# Patient Record
Sex: FEMALE | Race: WHITE | NOT HISPANIC OR LATINO | Employment: FULL TIME | URBAN - METROPOLITAN AREA
[De-identification: names, ages, dates, MRNs, and addresses within clinical notes are randomized per-mention and may not be internally consistent; named-entity substitution may affect disease eponyms.]

---

## 2023-09-30 ENCOUNTER — PHARMACY VISIT (OUTPATIENT)
Dept: PHARMACY | Facility: CLINIC | Age: 48
End: 2023-09-30
Payer: COMMERCIAL

## 2023-10-05 DIAGNOSIS — Z12.11 COLON CANCER SCREENING: ICD-10-CM

## 2023-10-05 DIAGNOSIS — Z12.31 ENCOUNTER FOR SCREENING MAMMOGRAM FOR MALIGNANT NEOPLASM OF BREAST: Primary | ICD-10-CM

## 2023-10-05 DIAGNOSIS — E66.01 MORBID OBESITY (MULTI): ICD-10-CM

## 2023-10-05 DIAGNOSIS — N60.01 BREAST CYST, RIGHT: ICD-10-CM

## 2023-10-05 DIAGNOSIS — R53.83 OTHER FATIGUE: ICD-10-CM

## 2023-10-05 DIAGNOSIS — R73.03 PREDIABETES: ICD-10-CM

## 2023-10-06 NOTE — PROGRESS NOTES
Apparently patient called to request her mammogram and ultrasound breast orders and colonoscopy order and lab order to be updated.

## 2023-10-24 ENCOUNTER — SOCIAL WORK (OUTPATIENT)
Dept: BEHAVIORAL HEALTH | Facility: CLINIC | Age: 48
End: 2023-10-24
Payer: COMMERCIAL

## 2023-10-24 DIAGNOSIS — F41.9 ANXIETY AND DEPRESSION: ICD-10-CM

## 2023-10-24 DIAGNOSIS — F43.10 PTSD (POST-TRAUMATIC STRESS DISORDER): ICD-10-CM

## 2023-10-24 DIAGNOSIS — F32.A ANXIETY AND DEPRESSION: ICD-10-CM

## 2023-10-24 PROCEDURE — 90837 PSYTX W PT 60 MINUTES: CPT | Performed by: SOCIAL WORKER
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
## 2023-10-29 PROBLEM — R31.9 HEMATURIA: Status: ACTIVE | Noted: 2023-10-29

## 2023-10-29 PROBLEM — F32.A ANXIETY AND DEPRESSION: Status: ACTIVE | Noted: 2023-10-29

## 2023-10-29 PROBLEM — R53.83 FATIGUE: Status: ACTIVE | Noted: 2023-10-29

## 2023-10-29 PROBLEM — F51.5 NIGHTMARES ASSOCIATED WITH CHRONIC POST-TRAUMATIC STRESS DISORDER: Status: ACTIVE | Noted: 2023-10-29

## 2023-10-29 PROBLEM — R73.01 ELEVATED FASTING GLUCOSE: Status: ACTIVE | Noted: 2023-10-29

## 2023-10-29 PROBLEM — F33.40 DEPRESSION, MAJOR, RECURRENT, IN REMISSION (CMS-HCC): Status: ACTIVE | Noted: 2023-10-29

## 2023-10-29 PROBLEM — E78.5 HYPERLIPIDEMIA: Status: ACTIVE | Noted: 2023-10-29

## 2023-10-29 PROBLEM — S09.90XA INJURY OF HEAD AND NECK: Status: ACTIVE | Noted: 2023-10-29

## 2023-10-29 PROBLEM — R63.5 WEIGHT GAIN: Status: ACTIVE | Noted: 2023-10-29

## 2023-10-29 PROBLEM — R92.8 ABNORMAL FINDING ON MAMMOGRAPHY: Status: ACTIVE | Noted: 2023-10-29

## 2023-10-29 PROBLEM — Z86.59 HISTORY OF CLAUSTROPHOBIA: Status: ACTIVE | Noted: 2023-10-29

## 2023-10-29 PROBLEM — F43.12 NIGHTMARES ASSOCIATED WITH CHRONIC POST-TRAUMATIC STRESS DISORDER: Status: ACTIVE | Noted: 2023-10-29

## 2023-10-29 PROBLEM — R29.898 WEAKNESS OF LIMB: Status: ACTIVE | Noted: 2023-10-29

## 2023-10-29 PROBLEM — H52.03 HYPEROPIA OF BOTH EYES: Status: ACTIVE | Noted: 2023-10-29

## 2023-10-29 PROBLEM — F41.9 ANXIETY AND DEPRESSION: Status: ACTIVE | Noted: 2023-10-29

## 2023-10-29 PROBLEM — R30.0 DYSURIA: Status: ACTIVE | Noted: 2023-10-29

## 2023-10-29 PROBLEM — F90.9 ADHD (ATTENTION DEFICIT HYPERACTIVITY DISORDER): Status: ACTIVE | Noted: 2023-10-29

## 2023-10-29 PROBLEM — R32 URINE INCONTINENCE: Status: ACTIVE | Noted: 2023-10-29

## 2023-10-29 PROBLEM — R29.898 TRANSIENT LEFT LEG WEAKNESS: Status: ACTIVE | Noted: 2023-10-29

## 2023-10-29 PROBLEM — E88.810 INSULIN RESISTANCE SYNDROME: Status: ACTIVE | Noted: 2023-10-29

## 2023-10-29 PROBLEM — G47.33 OBSTRUCTIVE SLEEP APNEA: Status: ACTIVE | Noted: 2023-10-29

## 2023-10-29 PROBLEM — S19.9XXA INJURY OF HEAD AND NECK: Status: ACTIVE | Noted: 2023-10-29

## 2023-10-29 PROBLEM — S92.309A CLOSED FRACTURE OF METATARSAL BONE: Status: ACTIVE | Noted: 2017-10-27

## 2023-10-29 PROBLEM — G47.10 HYPERSOMNIA: Status: ACTIVE | Noted: 2023-10-29

## 2023-10-29 PROBLEM — H52.203 ASTIGMATISM OF BOTH EYES: Status: ACTIVE | Noted: 2023-10-29

## 2023-10-29 PROBLEM — F34.1 PERSISTENT DEPRESSIVE DISORDER WITH ANXIOUS DISTRESS, CURRENTLY MODERATE: Status: ACTIVE | Noted: 2023-10-29

## 2023-10-29 PROBLEM — F43.10 POSTTRAUMATIC STRESS DISORDER: Status: ACTIVE | Noted: 2023-10-29

## 2023-10-29 PROBLEM — R31.0 GROSS HEMATURIA: Status: ACTIVE | Noted: 2023-10-29

## 2023-10-29 PROBLEM — M76.899 QUADRICEPS TENDONITIS: Status: ACTIVE | Noted: 2023-10-29

## 2023-10-29 PROBLEM — E03.9 HYPOTHYROIDISM: Status: ACTIVE | Noted: 2023-10-29

## 2023-10-29 PROBLEM — G57.90 NEUROPATHY, LEG: Status: ACTIVE | Noted: 2023-10-29

## 2023-10-29 PROBLEM — E55.9 VITAMIN D DEFICIENCY: Status: ACTIVE | Noted: 2023-10-29

## 2023-10-29 PROBLEM — F43.89 OTHER REACTIONS TO SEVERE STRESS: Status: ACTIVE | Noted: 2023-10-29

## 2023-10-29 PROBLEM — S83.002A SUBLUXATION OF LEFT PATELLA: Status: ACTIVE | Noted: 2023-10-29

## 2023-10-29 PROBLEM — M22.2X2 PATELLOFEMORAL STRESS SYNDROME OF LEFT KNEE: Status: ACTIVE | Noted: 2023-10-29

## 2023-10-29 RX ORDER — METFORMIN HYDROCHLORIDE 500 MG/1
TABLET, EXTENDED RELEASE ORAL
COMMUNITY
Start: 2022-09-19 | End: 2023-12-13

## 2023-10-29 RX ORDER — ALBUTEROL SULFATE 90 UG/1
AEROSOL, METERED RESPIRATORY (INHALATION)
COMMUNITY
End: 2023-12-13

## 2023-10-29 RX ORDER — ASPIRIN 325 MG
1 TABLET ORAL DAILY
COMMUNITY
Start: 2021-10-12 | End: 2023-12-13

## 2023-10-29 RX ORDER — BUSPIRONE HYDROCHLORIDE 7.5 MG/1
7.5 TABLET ORAL 2 TIMES DAILY
COMMUNITY
Start: 2023-06-01 | End: 2023-12-13

## 2023-10-29 RX ORDER — PRAZOSIN HYDROCHLORIDE 1 MG/1
2 CAPSULE ORAL NIGHTLY
COMMUNITY
Start: 2023-03-16 | End: 2023-12-13

## 2023-10-29 RX ORDER — IBUPROFEN 600 MG/1
1 TABLET ORAL AS NEEDED
COMMUNITY
End: 2023-12-19 | Stop reason: WASHOUT

## 2023-10-29 RX ORDER — CHOLECALCIFEROL (VITAMIN D3) 125 MCG
CAPSULE ORAL
COMMUNITY
Start: 2021-10-12 | End: 2023-12-13

## 2023-10-29 RX ORDER — ESTRADIOL 0.03 MG/D
1 FILM, EXTENDED RELEASE TRANSDERMAL 2 TIMES WEEKLY
COMMUNITY
Start: 2021-10-12 | End: 2023-12-13

## 2023-10-29 RX ORDER — NITROFURANTOIN 25; 75 MG/1; MG/1
100 CAPSULE ORAL EVERY 12 HOURS
COMMUNITY
Start: 2022-10-23 | End: 2023-12-13

## 2023-10-29 RX ORDER — CHOLECALCIFEROL (VITAMIN D3) 50 MCG
TABLET ORAL
COMMUNITY
End: 2023-12-13

## 2023-10-29 RX ORDER — SENNOSIDES/DOCUSATE SODIUM 8.6MG-50MG
TABLET ORAL
COMMUNITY
Start: 2022-09-19

## 2023-10-29 RX ORDER — LANCETS
EACH MISCELLANEOUS
COMMUNITY
Start: 2023-06-21

## 2023-10-29 RX ORDER — LEVOTHYROXINE SODIUM 25 UG/1
1 TABLET ORAL DAILY
COMMUNITY
Start: 2020-10-30 | End: 2023-12-13

## 2023-10-31 ENCOUNTER — APPOINTMENT (OUTPATIENT)
Dept: PRIMARY CARE | Facility: CLINIC | Age: 48
End: 2023-10-31
Payer: COMMERCIAL

## 2023-11-07 ENCOUNTER — SOCIAL WORK (OUTPATIENT)
Dept: BEHAVIORAL HEALTH | Facility: CLINIC | Age: 48
End: 2023-11-07
Payer: COMMERCIAL

## 2023-11-07 DIAGNOSIS — F41.9 ANXIETY AND DEPRESSION: ICD-10-CM

## 2023-11-07 DIAGNOSIS — F43.10 POSTTRAUMATIC STRESS DISORDER: ICD-10-CM

## 2023-11-07 DIAGNOSIS — F32.A ANXIETY AND DEPRESSION: ICD-10-CM

## 2023-11-07 PROCEDURE — 90837 PSYTX W PT 60 MINUTES: CPT | Performed by: SOCIAL WORKER

## 2023-11-07 NOTE — PROGRESS NOTES
TIME:   2-3pm    LOCATION:   Home ([facility]).    CHIEF COMPLAINT:  Severe family issues impacting mood and functioning.    TREATMENT MODALITY:  EMDR Therapy, 1 x week.    DIAGNOSES:  Anxiety and Depression (F41.9 and F32.A)  Posttraumatic Stress Disorder (F43.10)    SYMPTOMS:  Client continues to struggle with trauma related symptoms, avoidance negative cognitions, arousal/reactivity and functional impairment.  Client struggling with the reality of the situation leaving her to feel alone and not having people to count on, as well as grieving about the losses involved in her predicament with her mother and son (N).     MENTAL STATUS:  Client was oriented to person, place and time.  Mood/affect: Angry.    NARRATIVE:  Processed issues with ex, son N, work and her mother. Provided support.  Also explored ways to dilute stress.    PLAN:  Resolve trauma and related negative cognitions, emotional charge and somatic distress.

## 2023-11-14 ENCOUNTER — TREATMENT (OUTPATIENT)
Dept: PHYSICAL THERAPY | Facility: CLINIC | Age: 48
End: 2023-11-14
Payer: COMMERCIAL

## 2023-11-14 ENCOUNTER — APPOINTMENT (OUTPATIENT)
Dept: BEHAVIORAL HEALTH | Facility: CLINIC | Age: 48
End: 2023-11-14
Payer: COMMERCIAL

## 2023-11-14 DIAGNOSIS — M54.2 CERVICALGIA: Primary | ICD-10-CM

## 2023-11-14 DIAGNOSIS — F07.81 POST CONCUSSION SYNDROME: ICD-10-CM

## 2023-11-14 PROCEDURE — 97110 THERAPEUTIC EXERCISES: CPT | Mod: GP | Performed by: PHYSICAL THERAPIST

## 2023-11-14 PROCEDURE — 97140 MANUAL THERAPY 1/> REGIONS: CPT | Mod: GP | Performed by: PHYSICAL THERAPIST

## 2023-11-14 ASSESSMENT — PAIN - FUNCTIONAL ASSESSMENT: PAIN_FUNCTIONAL_ASSESSMENT: 0-10

## 2023-11-14 NOTE — PROGRESS NOTES
Physical Therapy    Physical Therapy Treatment    Patient Name: Paradise Brothers  MRN: [id number]  Today's Date: 11/14/2023         Assessment:   Patient with increased tone in left>right paraspinals this date.  Pain decreased to 2/10 at end of session.    Plan:   Add thoracic strengthening to HEP    Current Problem  No diagnosis found.    Subjective   Patient states that she had been doing good and then pain kicked up and she was able to get right side under control, but left is unable to get resolved.    General  Reason for Referral: post consussion  Referred By: Jazmyne Herrera    Pain  Pain Assessment: 0-10  Pain Score:  (6-7)  Pain Location:  (left cervical and thoracic region)    Objective   Cognition     Posture   Forward head and rounded shoulders    Extremity/Trunk Assessment  Cervical ROM   PROM   EXTENSION 50   FLEXION 40   RIGHT ROTATION 60   LEFT ROTATION 50   RIGHT LATERAL FLEXION 40   LEFT LATERAL FLEXION 30     Treatments:  Therapeutic Exercise  Therapeutic Exercise Performed: Yes  Lateral cervical flexion cues for chin tuck to increase middle scalenes stretch  Upper cervical towel stretch   Lower cervical towel stretch    Manual Therapy  Manual Therapy Performed: Yes  Stripping to middle scalenes, trigger point to left paraspinals and occupitals with left >right.      Goals:  Active       PT Problem       Activity Limitation: 1. Post concussion        Start:  11/14/23    Expected End:  02/12/24       Activity Limitation: 1. Post concussion symptom rating scale at or less than 30 points to allow increased ease to return to normal activity.   2. Patient decrease DHI to 30 points for self reported improvement in function, by week 12   Balance: Will be assess on future visits, by week 12   Dizziness/Vertigo/Disequilibrium: Patient will report dizziness at 0-2/10 to allow increased ease in daily tasks, by week 12   Pain: Patient will report neck pain at 0-2/10 to allow increased ease in scanning her  environment, by week 12   Range Of Motion/Joint Mobility: Patient increase cervical rotation to 0-50 to the left to allow increased ease in scanning the environment, by week 12   Patient will be independent in Home Exercise Program., by week 1            Activity Limitation: 1. Post concussion symptom rating scale at or less than 30 points to allow increased ease to return to normal activity.   2. Patient decrease DHI to 30 points for self reported improvement in function, by week 12   Balance: Will be assess on future visits, by week 12   Dizziness/Vertigo/Disequilibrium: Patient will report dizziness at 0-2/10 to allow increased ease in daily tasks, by week 12   Pain: Patient will report neck pain at 0-2/10 to allow increased ease in scanning her environment, by week 12   Range Of Motion/Joint Mobility: Patient increase cervical rotation to 0-50 to the left to allow increased ease in scanning the environment, by week 12   Patient will be independent in Home Exercise Program., by week 1

## 2023-11-15 ENCOUNTER — APPOINTMENT (OUTPATIENT)
Dept: PHYSICAL THERAPY | Facility: CLINIC | Age: 48
End: 2023-11-15
Payer: COMMERCIAL

## 2023-11-17 NOTE — PROGRESS NOTES
Physical Therapy    Physical Therapy Treatment    Patient Name: Paradise Brothers  MRN: [id number]  Today's Date: 11/20/2023         Assessment:   Patient with continued increased tone in semi spinalis and paraspinals  Plan:   Continue MT    Current Problem  1. Cervicalgia        2. Post concussion syndrome          Subjective  Patient states that she has a migraine in a banana pattern after last session.    Pain  Pain Assessment  Pain Assessment: 0-10  Pain Score:  (left cervical region)sore    Objective     Treatments:    NEXT session add cervical flexion with over pressure with towel roll to minimize posture slouching     Therapeutic Exercise  Therapeutic Exercise Performed: Yes  Lateral cervical flexion cues for chin tuck to increase middle scalenes stretch  Upper cervical towel stretch   Lower cervical towel stretch    Manual Therapy  Manual Therapy Performed: Yes  Trigger point release to posterior scalenes, semispinalis, paraspinals left side greater than right.  Patient educated to use heat and ice as a headache can easily occur tomorrow due to muscle release of lactic acid.    Stripping to middle scalenes, trigger point to left paraspinals and occupitals with left >right.      Goals:  Active       PT Problem       Activity Limitation: 1. Post concussion        Start:  11/14/23    Expected End:  02/12/24       Activity Limitation: 1. Post concussion symptom rating scale at or less than 30 points to allow increased ease to return to normal activity.   2. Patient decrease DHI to 30 points for self reported improvement in function, by week 12   Balance: Will be assess on future visits, by week 12   Dizziness/Vertigo/Disequilibrium: Patient will report dizziness at 0-2/10 to allow increased ease in daily tasks, by week 12   Pain: Patient will report neck pain at 0-2/10 to allow increased ease in scanning her environment, by week 12   Range Of Motion/Joint Mobility: Patient increase cervical rotation to 0-50 to the  left to allow increased ease in scanning the environment, by week 12   Patient will be independent in Home Exercise Program., by week 1

## 2023-11-20 ENCOUNTER — TREATMENT (OUTPATIENT)
Dept: PHYSICAL THERAPY | Facility: CLINIC | Age: 48
End: 2023-11-20
Payer: COMMERCIAL

## 2023-11-20 DIAGNOSIS — M54.2 CERVICALGIA: Primary | ICD-10-CM

## 2023-11-20 DIAGNOSIS — F07.81 POST CONCUSSION SYNDROME: ICD-10-CM

## 2023-11-20 PROCEDURE — 97140 MANUAL THERAPY 1/> REGIONS: CPT | Mod: GP | Performed by: PHYSICAL THERAPIST

## 2023-11-20 ASSESSMENT — PAIN - FUNCTIONAL ASSESSMENT: PAIN_FUNCTIONAL_ASSESSMENT: 0-10

## 2023-11-21 ENCOUNTER — SOCIAL WORK (OUTPATIENT)
Dept: BEHAVIORAL HEALTH | Facility: CLINIC | Age: 48
End: 2023-11-21
Payer: COMMERCIAL

## 2023-11-21 ENCOUNTER — APPOINTMENT (OUTPATIENT)
Dept: PRIMARY CARE | Facility: CLINIC | Age: 48
End: 2023-11-21
Payer: COMMERCIAL

## 2023-11-21 DIAGNOSIS — F43.10 POSTTRAUMATIC STRESS DISORDER: ICD-10-CM

## 2023-11-21 DIAGNOSIS — F41.9 ANXIETY AND DEPRESSION: ICD-10-CM

## 2023-11-21 DIAGNOSIS — F32.A ANXIETY AND DEPRESSION: ICD-10-CM

## 2023-11-21 PROCEDURE — 90837 PSYTX W PT 60 MINUTES: CPT | Performed by: SOCIAL WORKER

## 2023-11-21 NOTE — PROGRESS NOTES
TIME:   11-12pm    LOCATION:   Home ([facility]).    CHIEF COMPLAINT:  Severe family issues impacting mood and functioning.    TREATMENT MODALITY:  EMDR Therapy, 1 x week.    DIAGNOSES:  Anxiety and Depression (F41.9 and F32.A)  Posttraumatic Stress Disorder (F43.10)    SYMPTOMS:  Client continues to struggle with trauma related symptoms, avoidance negative cognitions, arousal/reactivity and functional impairment.  Client struggling with sons's estrangement, work load.    MENTAL STATUS:  Client was oriented to person, place and time.  Mood/affect: Angry.    NARRATIVE:  Processed family issues.  Client continues to deal with problems with ex wife, son (N) and the legal system; client indicates she will not be able to take time off until January, but is is able to take time to enjoy the Thanksgiving holiday with friends and son (S). Provided support.    PLAN:  Resolve trauma and related negative cognitions, emotional charge and somatic distress.

## 2023-11-21 NOTE — PROGRESS NOTES
Subjective   Patient ID:   [id number]   [id number]   Paradise Brothers is a 47 y.o. female who presents for No chief complaint on file..  HPI    ROS were reviewed and are negative with the exception of what is noted in HPI    There were no vitals taken for this visit.  Objective   Physical Exam    Assessment/Plan   Problem List Items Addressed This Visit    None       Provider Impressions     1. left leg weakness - complete neurologic workup was negative. SEems to be suprapatellar and related to her knee. SHe is doing better walking her new dog. REferral to ortho [facility] although not lots of free time.   2. Fatigue - actually doing better using CPAP, lots of personal stressors.   3. Borderline hypertension - she has continued to lose weight and her pressure is much better today. SHe is doing so much better. .   4. Depression - she has good support with psychiatry. On bupropion. MAny life stressors primarily related to her sons and  partner and the stresses in trying to remain in her parenting role for her sons.   5. KERRY - she has CPAP mask at home now.   6. weight- she saw endocrine and the Wegovy was denied. SHe has done quite well with the bupropion and the metformin losing 20 pounds. REcheck her hemoglobin A1C and feel if her number is elevated would be able to prescribe the GLP1 agonist for control  7. Vitamin D - takes supplements  8. Foot fracture - doing better  9. Facial rash - rosacea suspected. To derm eventually  10. shoulder repair - better  11. Health maintenance - full PE 3/23 today,. PAP (s/p hysterectomy, one ovary intact) mammo ordered with repeat ultrasound of breast probably benign cyst. Colonoscopy ordered, counseled about personal status majority of time.    Jazmyne Herrera MD

## 2023-11-26 NOTE — PROGRESS NOTES
Physical Therapy    Physical Therapy Treatment    Patient Name: Paradise Brothers  MRN: [id number]  Today's Date: 11/25/2023         Assessment:     Plan:       Current Problem  No diagnosis found.    General          Subjective    Precautions     Vital Signs     Pain       Objective   Cognition     Posture     Extremity/Trunk Assessment  {Extremity/Trunk Assessments:15845}  {Spine,UE,LE,HAND,LYMPHEDEMA:77861}  Activity Tolerance:     Outcome Measures:  {PT Outcome Measures:18828}  Treatments:  {PT Treatments:12741}  NEXT session add cervical flexion with over pressure with towel roll to minimize posture slouching     Therapeutic Exercise  Therapeutic Exercise Performed: Yes  Lateral cervical flexion cues for chin tuck to increase middle scalenes stretch  Upper cervical towel stretch   Lower cervical towel stretch    Manual Therapy  Manual Therapy Performed: Yes  Trigger point release to posterior scalenes, semispinalis, paraspinals left side greater than right.  Patient educated to use heat and ice as a headache can easily occur tomorrow due to muscle release of lactic acid.    Stripping to middle scalenes, trigger point to left paraspinals and occupitals with left >right.      OP EDUCATION:       Goals:

## 2023-11-27 ENCOUNTER — APPOINTMENT (OUTPATIENT)
Dept: PHYSICAL THERAPY | Facility: CLINIC | Age: 48
End: 2023-11-27
Payer: COMMERCIAL
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
## 2023-11-29 ENCOUNTER — APPOINTMENT (OUTPATIENT)
Dept: SLEEP MEDICINE | Facility: CLINIC | Age: 48
End: 2023-11-29
Payer: COMMERCIAL

## 2023-12-05 ENCOUNTER — SOCIAL WORK (OUTPATIENT)
Dept: BEHAVIORAL HEALTH | Facility: CLINIC | Age: 48
End: 2023-12-05
Payer: COMMERCIAL

## 2023-12-05 DIAGNOSIS — F32.A ANXIETY AND DEPRESSION: ICD-10-CM

## 2023-12-05 DIAGNOSIS — F43.10 PTSD (POST-TRAUMATIC STRESS DISORDER): ICD-10-CM

## 2023-12-05 DIAGNOSIS — F41.9 ANXIETY AND DEPRESSION: ICD-10-CM

## 2023-12-05 PROCEDURE — 90837 PSYTX W PT 60 MINUTES: CPT | Performed by: SOCIAL WORKER

## 2023-12-05 NOTE — PROGRESS NOTES
TIME:   11-12pm    LOCATION:   Home ([facility]).    CHIEF COMPLAINT:  Severe family issues impacting mood and functioning.    DIAGNOSES:  Anxiety and Depression (F41.9 and F32.A)  Posttraumatic Stress Disorder (F43.10)    TREATMENT MODALITY:  EMDR Therapy, Supportive. 1 x week.    SYMPTOMS:  Client continues to struggle with trauma related symptoms, avoidance negative cognitions, arousal/reactivity and functional impairment.  Client struggling with sons's estrangement, ex wife's lack of collaboration, the legal system and her work load.    MENTAL STATUS:  Client was oriented to person, place and time.  Mood/affect: Angry.    NARRATIVE:  Processed family issues.  Client continues to deal with problems with ex wife, son (N) and the legal system; client relates desire to relinquish parenting rights for son N. Explored the issue, worked to co-regulate, attune and validate, and provided support.    PLAN:  Resolve trauma and related negative cognitions, emotional charge and somatic distress.

## 2023-12-06 ENCOUNTER — TREATMENT (OUTPATIENT)
Dept: PHYSICAL THERAPY | Facility: CLINIC | Age: 48
End: 2023-12-06
Payer: COMMERCIAL

## 2023-12-06 DIAGNOSIS — M54.2 CERVICALGIA: Primary | ICD-10-CM

## 2023-12-06 DIAGNOSIS — F07.81 POST CONCUSSION SYNDROME: ICD-10-CM

## 2023-12-06 PROCEDURE — 97140 MANUAL THERAPY 1/> REGIONS: CPT | Mod: GP | Performed by: PHYSICAL THERAPIST

## 2023-12-06 ASSESSMENT — PAIN SCALES - GENERAL: PAINLEVEL_OUTOF10: 3

## 2023-12-06 ASSESSMENT — PAIN - FUNCTIONAL ASSESSMENT: PAIN_FUNCTIONAL_ASSESSMENT: 0-10

## 2023-12-06 NOTE — PROGRESS NOTES
Physical Therapy    Physical Therapy Treatment    Patient Name: Paradise Brothers  MRN: [id number]  Today's Date: 11/25/2023         Assessment:  Patient with continued tone on left side in rhomboid and middle scalenes.     Plan:  Continue to work to manage tone    Current Problem  No diagnosis found.      Subjective  Patient reports continued left side pain    Pain  See Flow sheet     Objective     Treatments:    NEXT session add cervical flexion with over pressure with towel roll to minimize posture slouching     Therapeutic Exercise  Therapeutic Exercise Performed: no  Lateral cervical flexion cues for chin tuck to increase middle scalenes stretch  Upper cervical towel stretch   Lower cervical towel stretch    Manual Therapy  Manual Therapy Performed: Yes  Trigger point release to posterior scalenes, middle scalenes, paraspinals left side. Stripping to middle scalenes, trigger point to left paraspinals and occupitals with left >right.
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
## 2023-12-13 ENCOUNTER — SOCIAL WORK (OUTPATIENT)
Dept: BEHAVIORAL HEALTH | Facility: CLINIC | Age: 48
End: 2023-12-13
Payer: COMMERCIAL

## 2023-12-13 ENCOUNTER — OFFICE VISIT (OUTPATIENT)
Dept: SLEEP MEDICINE | Facility: CLINIC | Age: 48
End: 2023-12-13
Payer: COMMERCIAL

## 2023-12-13 VITALS
BODY MASS INDEX: 45.8 KG/M2 | HEIGHT: 66 IN | OXYGEN SATURATION: 98 % | SYSTOLIC BLOOD PRESSURE: 142 MMHG | DIASTOLIC BLOOD PRESSURE: 88 MMHG | WEIGHT: 285 LBS | HEART RATE: 72 BPM | RESPIRATION RATE: 18 BRPM

## 2023-12-13 DIAGNOSIS — F41.9 ANXIETY AND DEPRESSION: ICD-10-CM

## 2023-12-13 DIAGNOSIS — F32.A ANXIETY AND DEPRESSION: ICD-10-CM

## 2023-12-13 DIAGNOSIS — Z78.9 NONSMOKER: ICD-10-CM

## 2023-12-13 DIAGNOSIS — G47.33 OBSTRUCTIVE SLEEP APNEA (ADULT) (PEDIATRIC): ICD-10-CM

## 2023-12-13 DIAGNOSIS — G47.33 OBSTRUCTIVE SLEEP APNEA: Primary | ICD-10-CM

## 2023-12-13 DIAGNOSIS — F43.10 PTSD (POST-TRAUMATIC STRESS DISORDER): ICD-10-CM

## 2023-12-13 DIAGNOSIS — R03.0 ELEVATED BP WITHOUT DIAGNOSIS OF HYPERTENSION: ICD-10-CM

## 2023-12-13 PROCEDURE — 90837 PSYTX W PT 60 MINUTES: CPT | Performed by: SOCIAL WORKER

## 2023-12-13 PROCEDURE — 3008F BODY MASS INDEX DOCD: CPT | Performed by: NURSE PRACTITIONER

## 2023-12-13 PROCEDURE — 1036F TOBACCO NON-USER: CPT | Performed by: NURSE PRACTITIONER

## 2023-12-13 PROCEDURE — 99214 OFFICE O/P EST MOD 30 MIN: CPT | Performed by: NURSE PRACTITIONER

## 2023-12-13 ASSESSMENT — SLEEP AND FATIGUE QUESTIONNAIRES
HOW LIKELY ARE YOU TO NOD OFF OR FALL ASLEEP WHILE SITTING AND READING: SLIGHT CHANCE OF DOZING
HOW LIKELY ARE YOU TO NOD OFF OR FALL ASLEEP WHEN YOU ARE A PASSENGER IN A CAR FOR AN HOUR WITHOUT A BREAK: WOULD NEVER DOZE
WAKING_TOO_EARLY: MILD
HOW LIKELY ARE YOU TO NOD OFF OR FALL ASLEEP WHILE LYING DOWN TO REST IN THE AFTERNOON WHEN CIRCUMSTANCES PERMIT: SLIGHT CHANCE OF DOZING
SITING INACTIVE IN A PUBLIC PLACE LIKE A CLASS ROOM OR A MOVIE THEATER: SLIGHT CHANCE OF DOZING
SLEEP_PROBLEM_NOTICEABLE_TO_OTHERS: A LITTLE
HOW LIKELY ARE YOU TO NOD OFF OR FALL ASLEEP WHILE WATCHING TV: SLIGHT CHANCE OF DOZING
WORRIED_DISTRESSED_DUE_TO_SLEEP: NOT AT ALL NOTICEABLE
DIFFICULTY_STAYING_ASLEEP: MILD
SLEEP_PROBLEM_INTERFERES_DAILY_ACTIVITIES: A LITTLE
SATISFACTION_WITH_CURRENT_SLEEP_PATTERN: SATISFIED
HOW LIKELY ARE YOU TO NOD OFF OR FALL ASLEEP WHILE SITTING AND TALKING TO SOMEONE: WOULD NEVER DOZE
HOW LIKELY ARE YOU TO NOD OFF OR FALL ASLEEP IN A CAR, WHILE STOPPED FOR A FEW MINUTES IN TRAFFIC: WOULD NEVER DOZE
ESS-CHAD TOTAL SCORE: 4
HOW LIKELY ARE YOU TO NOD OFF OR FALL ASLEEP WHILE SITTING QUIETLY AFTER LUNCH WITHOUT ALCOHOL: WOULD NEVER DOZE

## 2023-12-13 ASSESSMENT — ENCOUNTER SYMPTOMS
LOSS OF SENSATION IN FEET: 0
OCCASIONAL FEELINGS OF UNSTEADINESS: 0
DEPRESSION: 0

## 2023-12-13 ASSESSMENT — COLUMBIA-SUICIDE SEVERITY RATING SCALE - C-SSRS
2. HAVE YOU ACTUALLY HAD ANY THOUGHTS OF KILLING YOURSELF?: NO
6. HAVE YOU EVER DONE ANYTHING, STARTED TO DO ANYTHING, OR PREPARED TO DO ANYTHING TO END YOUR LIFE?: NO
1. IN THE PAST MONTH, HAVE YOU WISHED YOU WERE DEAD OR WISHED YOU COULD GO TO SLEEP AND NOT WAKE UP?: NO

## 2023-12-13 ASSESSMENT — PATIENT HEALTH QUESTIONNAIRE - PHQ9
SUM OF ALL RESPONSES TO PHQ9 QUESTIONS 1 AND 2: 0
1. LITTLE INTEREST OR PLEASURE IN DOING THINGS: NOT AT ALL
2. FEELING DOWN, DEPRESSED OR HOPELESS: NOT AT ALL
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
## 2023-12-13 NOTE — ASSESSMENT & PLAN NOTE
#Elevated BP without dx of HTN  BP Readings from Last 1 Encounters:   12/13/23 142/88     - doing well, asymptomatic, denies any headache, blurry vision, chest pain, palpitation, dizziness, lightheadedness, or syncopal episodes  - discussed at length the impact of untreated KERRY and BP control  - encouraged to monitor BP at home and follow-up with PCP

## 2023-12-13 NOTE — PATIENT INSTRUCTIONS
[facility] Sleep Medicine  DO [address] [place] [address] [place] [address]       NAME: Paradise Brothers   DATE: 12/13/23    Your Sleep Provider Today: ELIS Garzon  Your Primary Care Physician: Jazmyne Herrera MD       DIAGNOSIS:   1. Obstructive sleep apnea            Thank you for coming to the Sleep Medicine Clinic today! Your sleep medicine provider today was: ELIS Garzon Below is a summary of your treatment plan, other important information, and our contact numbers:      TREATMENT PLAN     - Follow-up in 12 months.  - If not already done, sign up for 'My Chart' and send prescription requests or messages through this  - Order placed to Medical Services for over night pulse oximetry on CPAP, will call with results.    Annual Reminders About Your Sleep Apnea    Your sleep apnea is well controlled based on reviewing your PAP Data Report. A supply renewal prescription has been sent to your DME company- [employer].    General Reminders:  Continue current machine settings. Continue using machine every night. Need at least 4 hours daily usage.   Remember to clean your mask, tubings, and water chamber regularly as instructed.  Know the replacement schedule of your supplies/ accessories and contact your DME (durable medical equipment) provider if you are due for them.  Avoid driving or operating heavy machinery when drowsy. A person driving while sleepy is 5 times more likely to have an accident. If you feel sleepy, pull over and take a short power nap (sleep for less than 30 minutes). Otherwise, ask somebody to drive you.    Follow-up sooner through MyChart or calling our office if you have any of the following symptoms:  Snoring or stopping breathing while using the machine  Recurrence of fatigue, sleepiness, insomnia, and other symptoms you had prior using machine  Persistent or worsening fatigue or sleepiness despite regular use of  machine  Issues tolerating the machine like bloating sensation, air hunger, too much hot air, too much pressure, taking off mask without recall in the middle of sleep, etc.     Other conservative measures to improve sleep apnea:  Losing weight can lead to some improvement of KERRY which means you will need lower pressures in machine to control your KERRY. In some patients, they don't need to use the machine after bariatric surgery. Hence, consider medical and/or surgical weight loss especially if your BMI is more than 35.  Avoiding alcohol, sedative-hypnotics, and sedating medications is imperative as these substances can worsen snoring and sleep apnea  If you have nasal congestion or seasonal allergies, improving your breathing through the nose is critical for treating sleep apnea, tolerating CPAP, and improving your sleep; hence, using intranasal steroid spray like Flonase might help. Make sure you know the proper way to use it.  Stay off your back when sleeping.    Common issues with PAP machine:  Mask gets dislodged when turning to the side: Consider getting a CPAP pillow or switching to a mask with hose on top.     Dry mouth:  Your machine has built-in humidifier that heats up the air to prevent dry mouth. It can be adjusted to your comfort. You can try that first and increase setting one level one night at a time to check which setting is comfortable and effective in lessening dry mouth. If dry mouth persists despite humidity setting adjustment, may apply OTC Biotene gel over the gums at bedtime.  If Biotene gel is not effective, consider trying XEROSTOM gel from [employer].  Also, eliminate or reduce dose of meds that can cause dry mouth if possible. Lastly, may try getting a separate room humidifier machine.    Airleaks: Please call DME as they may need to adjust your mask or refit you with a different kind of mask. In addition, you can ask DME for tips on getting a good mask seal and mask fit.     Difficulty  "tolerating the mask: Contact your DME to try a different kind of mask and/or call office to get a referral to Sleep Psychologist for CPAP desensitization. CPAP desensitization technique is a set of strategies that helps patient cope with claustrophobia and anxiety related to wearing mask. Alternatively, we can do a daytime mini-sleep study called PAP-nap trial wherein you will try on different kinds of mask and the sleep technician will try different pressure settings on CPAP and BPAP machines to see which specific pressure is tolerable and comfortable for you.     Water droplets or moisture within the hose and/or mask: This is called rain-out and it is caused by condensation of too much heated humidity on the cooler walls of the hose. If you have rain-out, turn down humidity settings or get a heated hose. If you already have a heated hose, turn up the \"tube temperature\" of the heated hose. Alternatively, if you don't want to get a heated hose or warmer air, may wrap the CPAP hose with stockings to keep it somewhat warm. Also, you need to place the machine on the floor and lower the hose so that water won't travel upward towards your mask.       Maintaining your CPAP/BPAP device:    The humidification chamber (aka water tank or water chamber) needs to be filled with distilled water to prevent buildup of white deposits in the future. If you cannot find distilled water, you can use tap water but expect to have white deposits buildup seen after prolonged use with tap water. If you start seeing white deposits on the water chamber, you can clean it by filling it with equal parts of distilled white vinegar and water. Let the vinegar-water mixture sit for 2 hours, and then rinse it with running tap water. Clean with soap and water then let it dry.     You should try to keep your machine clean in order to work well. Here are some tips to clean PAP supplies / accessories:    Clean the humidification chamber (aka water tank) " as well as your mask and tubing at least once a week with soap and water.   Alternatively, you can fill a sink or basin with warm water and add a little mild detergent, like Ivory dish soap. Gently wipe your supplies with the soapy water to free all the oils and dirt that may have collected. Once that's done, rinse these items with clean water until the soap is gone and let them air dry. You can hang your tubing over the curtain mariama in your bathroom so that it dries.  The mask insert (part of the mask that has contact with your skin) needs to be cleaned with soap and water daily. Another option is to wipe them down with CPAP wipes or baby wipes.    You should replace your mask and tubing frequently in order to prevent bacteria buildup, machine damage, and mask seal issues. The older the mask and hose, the high likelihood that there is bacteria buildup in it especially if they are not cleaned regularly. Dirty filters damage machines because build-up of dust and contaminants can cause machine to over-heat, and in time, damage the motor of machine. Cushions lose their seal over time as most masks are made of plastic and silicone while headgear is made of neoprene. These materials will break down with age and frequent use. Here is the recommended replacement schedule for PAP supplies / accessories:    Twice a month- disposable filters and cushions for nasal mask or nasal pillows.  Once a month- cushion for full face mask  Every 3 months- mask with headgear and PAP tubing (standard or heated hose)  Every 6 months- reusable filter, water chamber, and chin strap     Other useful information:    Some people do not put water in the tank while other people prefers to put water in the tank to prevent mouth dryness. Try to experiment to determine which is more comfortable for you.   In general, new machines have 2 years warranty on parts while health insurance allows you to have a new machine once every 5 years.      IMPORTANT  INFORMATION     Call 911 for medical emergencies.  Our offices are generally open from Monday-Friday, 9 am - 5 pm.  If you need to get in touch with me, you may either call me/my team (number is below) or you can use [employer].  If a referral for a test, for CPAP, or for another specialist was made, and you have not heard about scheduling this within a week, please call scheduling at [phone] ([phone]).  If you are unable to make your appointment for clinic or an overnight study, kindly call the office at least 48 hours in advance to cancel and reschedule.  If you are on CPAP, please bring your device's card or the device to each clinic appointment.   There are no supporting services by either the sleep doctors or their staff on weekends and Holidays, or after 5 PM on weekdays.   If you have been asked to come to a sleep study, make sure you bring toiletries, a comfy pillow, and any nighttime medications that you may regularly take. Also be sure to eat dinner before you arrive. We generally do not provide meals.      PRESCRIPTIONS     We require 7 days advanced notice for prescription refills. If we do not receive the request in this time, we cannot guarantee that your medication will be refilled in time.      IMPORTANT PHONE NUMBERS       Appointments (for Adult Sleep Clinic): [phone] - option 2  Appointments (For Sleep Studies): [phone] - option 3  Behavioral Sleep Medicine: [phone]  Sleep Surgery: [phone]  ENT (Otolaryngology): [phone]  Headache Clinic (Neurology): [phone]  Neurology: [phone]  Psychiatry: [phone]  Pulmonary Function Testing (PFT) Center: [phone]  Pulmonary Medicine: [phone]  [email or web address] (DME): [phone]  [employer] (DME): [phone]  [facility] (DME): [phone]        CONTACTING YOUR SLEEP MEDICINE PROVIDER AND SLEEP TEAM      For issues with your machine or mask interface, please call your DME  "provider first. DME stands for durable medical company. DME is the company who provides you the machine and/or PAP supplies / accessories.   To schedule, cancel, or reschedule SLEEP STUDY APPOINTMENTS, please call the Main Phone Line at [phone] - option 3.   To schedule, cancel, or reschedule CLINIC APPOINTMENTS, you can do it in \"MyChart\", call [phone] for [facility] office , [phone] for [place]. office to speak with my on site staff, or call the Main Phone Line at [phone] - option 2  For CLINICAL QUESTIONS or MEDICATION REFILLS, please call direct line for Adult Sleep Nurses at [phone].   Lastly, you can also send a message directly to your provider through \"My Chart\", which is the email service through your  Records Account: [email or web address]     Adult Sleep Nurses (Tarah Renteria, RN and Sujey Morales RN):  For clinical questions and refilling prescriptions: [phone]  Email sleep diaries and other documents at: [email or web address]    Office locations for Karrie Oliva NP:    [address]  [place] [address] [phone]    [address]  [address]  [place] [address] [phone]      OUR SLEEP TESTING LOCATIONS     Our team will contact you to schedule your sleep study, however, you can contact us as follow:  Main Phone Line (scheduling only): [phone]), option 3    Sleep Testing Locations:   [place] (18 years and older): [address] [place] [address], 2nd floor   [place] (18 years and older): [address] [facility]; 4th floor  After hours line: [phone]   [place] (18 years and older) at [place]: [address] [facility]  After hours line: [phone]   [facility] (Main campus: All ages): [facility], 6th floor. After hours line: [phone]   [place] (5 years and older; younger considered on case-by-case basis): [address]; " [facility] [address], [address]. Scheduling  After hours line: [phone]       Here at [facility], we wish you a restful sleep!    Your sleep medicine provider for this visit was: XIOMARA Garzon-CNP

## 2023-12-13 NOTE — ASSESSMENT & PLAN NOTE
BMI Readings from Last 1 Encounters:   12/13/23 46.00 kg/m²     - Encouraged healthy weight loss via diet and exercise  - Weight loss can help in the long term treatment of KERRY.  - Defer management to PCP

## 2023-12-13 NOTE — ASSESSMENT & PLAN NOTE
#KERRY  - HST 8/12/21 --> moderate KERRY AASM criteria, AHI 3% 15.2, AHI 4% 6.9. SpO2 garcia 79.6%.   - Retrieved and personally reviewed recent PAP adherence download data today. See HPI.  - good compliance to PAP therapy, residual AHI at goal, and good control of KERRY symptoms. Encouraged to make routine of applying PAP, increase usage and duration.   - Reports pulse ox has been dropping in her sleep into mid 80s (monitored on apple watch) even with CPAP use. Will order overnight pulse ox on PAP to DME- [facility], will call patient with results.   - continue current setting 6-13 CWP  - renew PAP supply orders, order placed to DME- [facility]  - diet, exercise, and weight loss were emphasized today in clinic, as were non-supine sleep, avoiding alcohol in the late evening, and driving or operating heavy machinery when sleepy. Patient verbalized understanding.

## 2023-12-15 NOTE — PROGRESS NOTES
Paradise Brothers is a 47 y.o. female presenting for comprehensive weight management follow up.  Last seen over a year ago.  Started metformin but then stopped after a few weeks.     Has been exercising 30 minutes per day.  Walking 30 minutes per day.  3x/week bodyweight exercise.    Estimates she is eating 1900 calories on average with 40% protein and split carb/fat she is not losing weight.  She has been doing this over the last year with minimal success.       Onset of Obesity:   She endorses beginning to struggle with weight in mid 20s, was always active/athletic in high school and in late 20s became less active. She had children in early 30s, endorses a lot of work/familial/life stress around this time which she attribute to some weight gain period. At age 38, went through divorce, lost a fair amount weight. Early 40s underwent hysterectomy for endometrial hyperplasia and began gaining more weight following surgery. In Fall 2020 began struggling with more mobility issues and neuropathic pain in legs, had gained up to 292 with this which she attributes to increased inflammation  HW- 292  LW- 153      Previous Weight Management programs tried: 10k steps daily + 1800cal diet+ RD (9/2021-present)   Most weight loss success: #10      Self ID dietary challenge areas: portion control   Fast food: None  Frequency of added sugar beverages: None      Social Hx: Single   Lives with: 2 children   Employment: Medical physicist     Patient Active Problem List   Diagnosis    Headache    Closed fracture of metatarsal bone    Depression, major, recurrent, in remission (CMS/HCC)    Elevated fasting glucose    Fatigue    Gross hematuria    Hematuria    ADHD (attention deficit hyperactivity disorder)    Anxiety and depression    Other reactions to severe stress    Persistent depressive disorder with anxious distress, currently moderate    Posttraumatic stress disorder    Astigmatism of both eyes    Abnormal finding on mammography     Acne vulgaris    History of claustrophobia    Hyperlipidemia    Hyperopia of both eyes    Hypersomnia    Hypothyroidism    Injury of head and neck    Patellofemoral stress syndrome of left knee    Quadriceps tendonitis    Subluxation of left patella    Transient left leg weakness    Dysuria    Urine incontinence    Vitamin D deficiency    Weakness of limb    Weight gain    Insulin resistance syndrome    Neuropathy, leg    Nightmares associated with chronic post-traumatic stress disorder    Obstructive sleep apnea    BMI 45.0-49.9, adult (CMS/[facility])    Elevated BP without diagnosis of hypertension    Nonsmoker    Dietary counseling       Past Surgical History:   Procedure Laterality Date    OTHER SURGICAL HISTORY  03/12/2020    Shoulder arthroscopy    OTHER SURGICAL HISTORY  10/12/2021    Total vaginal hysterectomy    TONSILLECTOMY  07/25/2013    Tonsillectomy        Social History     Socioeconomic History    Marital status: Single     Spouse name: None    Number of children: None    Years of education: None    Highest education level: None   Occupational History    None   Tobacco Use    Smoking status: Never    Smokeless tobacco: Never   Substance and Sexual Activity    Alcohol use: Yes     Comment: rare    Drug use: Never    Sexual activity: None   Other Topics Concern    None   Social History Narrative    None     Social Determinants of Health     Financial Resource Strain: Not on file   Food Insecurity: Not on file   Transportation Needs: Not on file   Physical Activity: Not on file   Stress: Not on file   Social Connections: Not on file   Intimate Partner Violence: Not on file   Housing Stability: Not on file        Family History   Problem Relation Name Age of Onset    No Known Problems Mother      No Known Problems Father          REVIEW OF SYSTEMS:  Negative unless otherwise reviewed in HPI    PHYSICAL EXAM  /87 (BP Location: Left arm, Patient Position: Sitting, BP Cuff Size: Large adult)   Pulse 73   " Ht 1.676 m (5' 6\")   Wt 130 kg (287 lb)   SpO2 98%   BMI 46.32 kg/m²   General- No acute distress, well appearing and well nourished.   Eyes Conjunctiva and lids: No erythema, swelling or discharge  Neck supple, no thyroid nodules  CV: s1s2 reg/reg -m-g-r.    Pulmonary: Respiratory effort: Normal respiration. CTAB  Abdomen: Central adiposity  Neurologic - Coordination: Normal gait   Extremities: No clubbing, cyanosis  Psychiatric - Orientation to person, place, and time: Normal. Mood and affect: Normal.    ASSESSMENT & PLAN:  Paradise Brothers is presenting for an obesity management visit.    Current BMI: Body mass index is 46.32 kg/m².   Today's weight:   Wt Readings from Last 1 Encounters:   12/19/23 130 kg (287 lb)        Problem List Items Addressed This Visit       Weight gain     Obesity Class 3  Initial Weight: 276  BMI Body mass index is 46.32 kg/m².  [place] Stage 2  KERRY  Depression  Metabolic syndrome (DLD, IFG, Truncal obesity)    -Obesity is a chronic, relapsing, progressive, treatable, multifactorial, neurobehavioral disease, where in an increase in body fat promotes adipose (fat) tissue dysfunction, as well as biomechanical stress on the body which can result in adverse metabolic, biomechanical and psychosocial health consequences, in addition to reducing quality of life and lifespan.    -Without treatment, obesity is likely to progress and worsen, further increasing the patient's risk for numerous health conditions caused by excess adiposity and increasing the risk for premature death.    -The patient was counseled on the science of weight and appetite regulation. The pathophysiology of obesity was reviewed, as well as the biological adaptations to weight loss.     -We reviewed the role of nutrition, physical activity, stress and sleep. We discussed the potential for bariatric surgery and or anti-obesity medications in the treatment of obesity. We reviewed the health impacts of obesity, and the " health benefits of a 5-10% weight loss. We also reviewed program expectations and coordination of care. All additional questions were answered    - Reviewed role of bariatric surgical interventions for obesity treatment, indications for bariatric surgery, benefits of bariatric & metabolic surgery to long term health and obesity treatment.  - Meets ASMBS Guidelines for bariatric surgery with BMI > 40 or > 35 with weight related comorbidity: Yes - BMI > 40 with comorbidity.  We discussed role of bariatric surgery and she is open to consider, discussed LSG & RYGBP, metabolic benefits of surgery and role in treatment of obesity.  She is going to review additional information provided today on surgical program.     -Reviewed use of FDA approved anti obesity pharmacologic therapies and how they assist lifestyle chanages.  Discussed medications risk/benefit, expected weight loss outcomes with use, contraindications, side effects and monitoring parameters.    - Contraindications to AOMs: No absolute contraindications, mental health hx would just need monitoring with Contrave/Topiramate   No AOM covered by Rx plan.  Resume metformin given potential benefits to metabolic health & obesity treatment.   Currently on Wellbutrin, will also add naltrexone off label.      -Reviewed pertinent history, patient has no current contraindications to use of any prescribed medication for adjunct treatment in weight management.   -Counseling provided on clinical success criteria of 5% weight loss within 12 weeks of therapeutic dose of medication.              Relevant Medications    metFORMIN  mg 24 hr tablet    naltrexone (Depade) 50 mg tablet    Other Relevant Orders    Insulin, Fasting    Uric Acid    Lipid Panel    Comprehensive Metabolic Panel    Hemoglobin A1c    Insulin resistance syndrome - Primary     RADHA IR 4.7 (9/2022)    Update cardio metabolic and nutritional risks associated with obesity - see orders.      Recommend  controlled CHO Diet to improve glycemic control.    Carbohydrate portions at meals <40g per meal depending on activity level reviewed.  Avoid sugar sweetened beverages  Engage in regular aerobic exercise at least 150 minutes per week for CV benefit.  Encourage self monitoring of blood glucose values  Optimal values of blood glucose:  Fasting < 90  PreMeal < 100  30 minutes post meal < 140  60 minutes post meal < 120  90 minutes post meal < 100    Discussed roles of combining dietary protein, increased dietary fiber and reduction of simple carbohydrates to benefit glycemic control.   Discussed importance of regular exercise for glycemic control.              Relevant Medications    metFORMIN  mg 24 hr tablet    naltrexone (Depade) 50 mg tablet    Other Relevant Orders    Insulin, Fasting    Uric Acid    Lipid Panel    Comprehensive Metabolic Panel    Hemoglobin A1c    Obstructive sleep apnea     Encourage adherence with CPAP and regular follow up with sleep medicine.  Reviewed impact of sleep, untreated KERRY on disease process of obesity including effects on appetite hormone regulation.           Elevated BP without diagnosis of hypertension     Goal BP <120/80  Continue follow up with PCP for mgmt of antiHTN regimen  Encourage adherence to medications for BP control  DASH/Mediterranean Diet lifestyle encouraged.  Weight reduction of 5-10% of clinical significance to improve BP control  Continues to work on lifestyle change goals to support blood pressure control and weight reduction.           Dietary counseling     Patient was counseled on diet and nutrition including a discussion of current behaviors with appropriate education materials or resources provided.  - Counseling provided on impact of diet, physical activity, stress & sleep in treatment of obesity.    - Counseled on reduced calorie, high protein, high fiber, whole foods diet.  Counseling on benefits of avoidance of frequent intake of processed foods  and liquid calories.   - Counseled on behavioral modification strategies and tools to support weight loss.   - Reviewed pathophysiology of obesity in context of relationship to nutrition, energy balance and environmental factors that influence nutrition and energy balance outcomes.  - Counseled on various behavioral strategies and self monitoring practices to enhance understanding and individual assessment of energy balance, how various changes in types of foods consumed and macronutrient distribution can impact appetite regulation and be used as a tool in treatment of obesity.                > 50% of time spent counseling on lifestyle modifications to support weight loss.

## 2023-12-16 ENCOUNTER — APPOINTMENT (OUTPATIENT)
Dept: SLEEP MEDICINE | Facility: CLINIC | Age: 48
End: 2023-12-16
Payer: COMMERCIAL

## 2023-12-19 ENCOUNTER — PHARMACY VISIT (OUTPATIENT)
Dept: PHARMACY | Facility: CLINIC | Age: 48
End: 2023-12-19
Payer: COMMERCIAL

## 2023-12-19 ENCOUNTER — SOCIAL WORK (OUTPATIENT)
Dept: BEHAVIORAL HEALTH | Facility: CLINIC | Age: 48
End: 2023-12-19
Payer: COMMERCIAL

## 2023-12-19 ENCOUNTER — OFFICE VISIT (OUTPATIENT)
Dept: SURGERY | Facility: CLINIC | Age: 48
End: 2023-12-19
Payer: COMMERCIAL

## 2023-12-19 VITALS
BODY MASS INDEX: 46.12 KG/M2 | OXYGEN SATURATION: 98 % | DIASTOLIC BLOOD PRESSURE: 87 MMHG | HEIGHT: 66 IN | SYSTOLIC BLOOD PRESSURE: 142 MMHG | WEIGHT: 287 LBS | HEART RATE: 73 BPM

## 2023-12-19 DIAGNOSIS — F43.10 PTSD (POST-TRAUMATIC STRESS DISORDER): ICD-10-CM

## 2023-12-19 DIAGNOSIS — E88.810 INSULIN RESISTANCE SYNDROME: Primary | ICD-10-CM

## 2023-12-19 DIAGNOSIS — R03.0 ELEVATED BP WITHOUT DIAGNOSIS OF HYPERTENSION: ICD-10-CM

## 2023-12-19 DIAGNOSIS — R63.5 WEIGHT GAIN: ICD-10-CM

## 2023-12-19 DIAGNOSIS — F32.A ANXIETY AND DEPRESSION: ICD-10-CM

## 2023-12-19 DIAGNOSIS — F41.9 ANXIETY AND DEPRESSION: ICD-10-CM

## 2023-12-19 DIAGNOSIS — G47.33 OBSTRUCTIVE SLEEP APNEA: ICD-10-CM

## 2023-12-19 DIAGNOSIS — Z71.3 DIETARY COUNSELING: ICD-10-CM

## 2023-12-19 PROCEDURE — 99215 OFFICE O/P EST HI 40 MIN: CPT | Performed by: NURSE PRACTITIONER

## 2023-12-19 PROCEDURE — 99417 PROLNG OP E/M EACH 15 MIN: CPT | Performed by: NURSE PRACTITIONER

## 2023-12-19 PROCEDURE — 3008F BODY MASS INDEX DOCD: CPT | Performed by: NURSE PRACTITIONER

## 2023-12-19 PROCEDURE — 99402 PREV MED CNSL INDIV APPRX 30: CPT | Performed by: NURSE PRACTITIONER

## 2023-12-19 PROCEDURE — 1036F TOBACCO NON-USER: CPT | Performed by: NURSE PRACTITIONER

## 2023-12-19 PROCEDURE — RXMED WILLOW AMBULATORY MEDICATION CHARGE

## 2023-12-19 PROCEDURE — 90837 PSYTX W PT 60 MINUTES: CPT | Performed by: SOCIAL WORKER

## 2023-12-19 RX ORDER — METFORMIN HYDROCHLORIDE 500 MG/1
TABLET, EXTENDED RELEASE ORAL
Qty: 120 TABLET | Refills: 2 | Status: SHIPPED | OUTPATIENT
Start: 2023-12-19 | End: 2024-03-05 | Stop reason: ALTCHOICE

## 2023-12-19 RX ORDER — NALTREXONE HYDROCHLORIDE 50 MG/1
TABLET, FILM COATED ORAL
Qty: 30 TABLET | Refills: 2 | Status: SHIPPED | OUTPATIENT
Start: 2023-12-19 | End: 2024-03-05 | Stop reason: ALTCHOICE

## 2023-12-19 NOTE — PROGRESS NOTES
"TIME:   11am-12pm    LOCATION:   Home (Virtual).    CHIEF COMPLAINT:  Severe family issues impacting mood and functioning.    DIAGNOSES:  Anxiety and Depression (F41.9 and F32.A)  Posttraumatic Stress Disorder (F43.10)    TREATMENT MODALITY:  EMDR Therapy, Supportive. 1 x week.    SYMPTOMS:    Client continues to struggle with trauma related symptoms, avoidance negative cognitions, arousal/reactivity and functional impairment.     MENTAL STATUS  Client was oriented to person, place and time.  Mood/affect: Euthymic.     NARRATIVE:  Processed meeting with parenting coordinator and related issues. Provided support.    PLAN::  Resolve trauma and related negative cognitions, emotional charge and somatic distress. Possible future target:  \"It's my fault,\" or \"I'm responsible.\"  "

## 2023-12-19 NOTE — ASSESSMENT & PLAN NOTE
RADHA IR 4.7 (9/2022)    Update cardio metabolic and nutritional risks associated with obesity - see orders.      Recommend controlled CHO Diet to improve glycemic control.    Carbohydrate portions at meals <40g per meal depending on activity level reviewed.  Avoid sugar sweetened beverages  Engage in regular aerobic exercise at least 150 minutes per week for CV benefit.  Encourage self monitoring of blood glucose values  Optimal values of blood glucose:  Fasting < 90  PreMeal < 100  30 minutes post meal < 140  60 minutes post meal < 120  90 minutes post meal < 100    Discussed roles of combining dietary protein, increased dietary fiber and reduction of simple carbohydrates to benefit glycemic control.   Discussed importance of regular exercise for glycemic control.

## 2023-12-19 NOTE — ASSESSMENT & PLAN NOTE
Patient was counseled on diet and nutrition including a discussion of current behaviors with appropriate education materials or resources provided.  - Counseling provided on impact of diet, physical activity, stress & sleep in treatment of obesity.    - Counseled on reduced calorie, high protein, high fiber, whole foods diet.  Counseling on benefits of avoidance of frequent intake of processed foods and liquid calories.   - Counseled on behavioral modification strategies and tools to support weight loss.   - Reviewed pathophysiology of obesity in context of relationship to nutrition, energy balance and environmental factors that influence nutrition and energy balance outcomes.  - Counseled on various behavioral strategies and self monitoring practices to enhance understanding and individual assessment of energy balance, how various changes in types of foods consumed and macronutrient distribution can impact appetite regulation and be used as a tool in treatment of obesity.

## 2023-12-19 NOTE — ASSESSMENT & PLAN NOTE
Obesity Class 3  Initial Weight: 276  BMI Body mass index is 46.32 kg/m².  [place] Stage 2  KERRY  Depression  Metabolic syndrome (DLD, IFG, Truncal obesity)    -Obesity is a chronic, relapsing, progressive, treatable, multifactorial, neurobehavioral disease, where in an increase in body fat promotes adipose (fat) tissue dysfunction, as well as biomechanical stress on the body which can result in adverse metabolic, biomechanical and psychosocial health consequences, in addition to reducing quality of life and lifespan.    -Without treatment, obesity is likely to progress and worsen, further increasing the patient's risk for numerous health conditions caused by excess adiposity and increasing the risk for premature death.    -The patient was counseled on the science of weight and appetite regulation. The pathophysiology of obesity was reviewed, as well as the biological adaptations to weight loss.     -We reviewed the role of nutrition, physical activity, stress and sleep. We discussed the potential for bariatric surgery and or anti-obesity medications in the treatment of obesity. We reviewed the health impacts of obesity, and the health benefits of a 5-10% weight loss. We also reviewed program expectations and coordination of care. All additional questions were answered    - Reviewed role of bariatric surgical interventions for obesity treatment, indications for bariatric surgery, benefits of bariatric & metabolic surgery to long term health and obesity treatment.  - Meets ASMBS Guidelines for bariatric surgery with BMI > 40 or > 35 with weight related comorbidity: Yes - BMI > 40 with comorbidity.  We discussed role of bariatric surgery and she is open to consider, discussed LSG & RYGBP, metabolic benefits of surgery and role in treatment of obesity.  She is going to review additional information provided today on surgical program.     -Reviewed use of FDA approved anti obesity pharmacologic therapies and how they  assist lifestyle chanages.  Discussed medications risk/benefit, expected weight loss outcomes with use, contraindications, side effects and monitoring parameters.    - Contraindications to AOMs: No absolute contraindications, mental health hx would just need monitoring with Contrave/Topiramate   No AOM covered by Rx plan.  Resume metformin given potential benefits to metabolic health & obesity treatment.   Currently on Wellbutrin, will also add naltrexone off label.      -Reviewed pertinent history, patient has no current contraindications to use of any prescribed medication for adjunct treatment in weight management.   -Counseling provided on clinical success criteria of 5% weight loss within 12 weeks of therapeutic dose of medication.

## 2023-12-19 NOTE — ASSESSMENT & PLAN NOTE
Goal BP <120/80  Continue follow up with PCP for mgmt of antiHTN regimen  Encourage adherence to medications for BP control  DASH/Mediterranean Diet lifestyle encouraged.  Weight reduction of 5-10% of clinical significance to improve BP control  Continues to work on lifestyle change goals to support blood pressure control and weight reduction.

## 2023-12-19 NOTE — PATIENT INSTRUCTIONS
"Thank your for your interest in [facility] Bariatric Surgery Program.    Please go to our website: [email or web address]  Click on the box that says \"Click here to get started.\"  Watch the informational video then scroll down under the video and fill out the \"Patient Registration Form\" and submit.     Once received by our insurance team, a staff member will call your insurance to verify coverage and requirements necessary to optimize your health prior to bariatric surgery.  The bariatric surgery navigator will contact you to get you scheduled for your initial consultation with bariatric surgeon & bariatric dietitian - please be advised this process can take a few weeks to finalize.      If you have questions please contact the bariatric navigator team directly at [phone].    Per our program guidelines you will need to complete the following clearances prior to surgery scheduling, other clearances may be required depending on your specific medical conditions & history:   1. Copy of Sleep Study &/or a CPAP Compliance Report - you will be referred to sleep medicine, or if already using CPAP, you will need to request a compliance report from your CPAP provider company.  Please be advised that MINIMUM compliance with CPAP is 75%  2. Cardiology Clearance; referral placed if needed.  3. Psychological Evaluation/Clearance; referral placed if needed - this should be scheduled once you have the date of your initial consultation with the bariatric surgeon.  Please be aware that this clearance is only valid for 6 months.     4. Initial Bariatric Lab Work; labs will be ordered in preparation for your initial consultation with surgeon.  You will be contacted regarding abnormal results, results available to view on the  [facility].   5. Primary Care Letter of Support - ongoing relationship with a primary care provider is important before, during and after surgery to continue to follow you for non surgical " related care, medication adjustments after weight loss, etc.     Psychology clearance is only valid for 6 months, other clearances are valid for 12 months.    Your lab results are consistent with features of insulin resistance risk  RADHA-IR Score =  4.7 (baseline)  1.5-2.4 -> mild insulin resistance risk  2.5-2.9 --> moderate insulin resistance risk  >3 --> severe insulin resistance risk    Severity scores are based on available research and expert opinions in fields of metabolic health.    - Insulin Resistance is a disease process caused by chronic energy intake greater than expenditure.  It is a disease process on the spectrum of conditions which can progress to type 2 diabetes  - Insulin Resistance/Metabolic Syndrome is also associated with other obesity-related disorders including: Cardiovascular disease, Fatty Liver Disease (Steatosis, Fibrosis and Cirrhosis), Chronic Kidney Disease, Gout, Cognitive Decline, Dementia and certain types of cancers.  - Aggressive lifestyle modification focusing on nutritional changes to reduce excess calorie intake, reduce ultra processed carbohydrates and added sugars in the diet, weight reduction, increased physical activity, improved sleep management and stress reduction are the primary therapies for the management of this diagnosis.    This disease process can be improved with lifestyle therapy and clinical improvements are achieved with a body weight reduction of 7-10% total body weight.    Insulin resistance is a complex process that can be influenced by many factors.  Some are dietary, such as excess intake of calories, sugar sweetened beverages and ultra processed carbohydrates.  Other factors that may be involved are inadequate or poor sleep, untreated sleep apnea, chronic stress resulting in low grade chronic elevation of cortisol (one of our stress hormones which can cause our liver to produce more glucose and increase our blood sugar), inflammatory conditions in the  "body (including obesity itself which is a low grade inflammatory condition) and lack of regular exercise and low levels of physical activity.      Please focus on the following over the next few weeks:  - The use of a controlled carbohydrate diet approach can be helpful with insulin resistance, emphasizing a focus on increased dietary protein, high fiber, whole food based carbohydrate sources (foods like vegetables, fruits, beans, lentils, 100% whole grain, etc).  If you need a referral to a clinical dietitian please let me know.    -  Continue to strive to increase dietary protein at each meal.    Goal protein intake 30g per meal.  Balancing grams of carbohydrate and protein in 1:1 ratio at meals can help keep blood sugar stable after meals (for example 30g protein matched to 30-40g carbohydrate) - working with a clinical dietitian can help teach you about carbohydrate counting at meals to get you more comfortable with understanding how to appropriately track carbohydrates at meals.       - Increase purposeful physical activity, exercise is one of the most potent tools we have to improve insulin sensitivity.  Even just 10 minutes of walking after each meal can improve your blood sugar stability and improve insulin resistance over time.  Physical activity allows our muscles to use glucose/sugar as a fuel source without requiring insulin to get sugar/glucose into the muscle tissue/cells for use with exercise.  Physical activity also helps use muscle stores of glucose, giving you a place to \"store\" glucose safely in the muscle for future use.      - Focus on sleep hygiene to improve your sleep quality.  Sleep disruption, short sleep or interrupted sleep worsens insulin resistance by increasing our stress hormone levels.     - If you have sleep apnea WEAR YOUR CPAP!  If you have never been evaluated for sleep apnea, we want to discuss having you see sleep medicine for a sleep study at your follow up.    - Make your " "last meal of the day 2-3 hours before bedtime.      Medications:  You have been prescribed metformin in treatment of insulin resistance/metabolic syndrome and/or obesity.    Use of metformin may help improve leptin sensitivity (hormone that helps to reduce hunger, \"satiety or fullness\" hormone).  Metformin may also have weight loss effect due to impact to the gut microbiome and changes in gut that promote improved recognition of nutrient sensing and fullness in the gastrointestinal brain communication pathways.   Also helps to reduce insulin resistance.     Use of metformin among individuals at risk for development of diabetes (prediabetes, metabolic syndrome, insulin resistance) has been associated with weight reduction of 3-8% body weight on average.  Patients who adhere to therapy typically may experience greater benefit.      Metformin is used off label in weight management.  Common side effects include: nausea, diarrhea, upset stomach.    Following a high protein, low processed carbohydrate diet which is low in added sugars and refined flours may help reduce GI symptoms.      I do recommend taking a methylated B complex while on Metformin to ensure adequate B12 levels as prolonged use of Metformin can reduce B12 absorption.    IMPORTANT Lifestyle Management with the use of anti-obesity medication include:  - Monitor/track your protein intake, ensure at least 80-90g of protein per day for women, 110-120g for men -> this helps to ensure you are consuming adequate protein to maintain/preserve lean body mass in weight loss setting.  - Consume at least 64 oz of water per day -> this helps to reduce the risk of constipation/dehydration associated with this medication  - Engage in resistance at least 2x/week - targeting upper & lower body group with each resistance training session -> this helps to ensure you are consuming adequate protein to maintain/preserve lean body mass in weight loss setting.      We have " reviewed the risks and benefits of naltrexone/bupropion.    These medications are being used off label for obesity management.      These medications may help improve eating related behaviors and reduce cravings/reward response from food.     You cannot take these medications if you have a history of seizures or glaucoma.     We confirmed that you are not currently using any opioid pain medications, or any other substances such as heroin and understand that these substances cannot be taken with naltrexone.     Please be aware that naltrexone reduces the euphoric effect of alcohol intake, it will not prevent you from the impairing effects of alcohol.    Opioid medications should not be used for at least 7-10 days prior to starting naltrexone, and you should stop naltrexone at least 10 days before taking any opioid -containing substances or undergoing a procedure requiring sedation or general anesthesia.  Illicit drugs such as heroin should always be avoided.     Please monitor mood, stop the medication and contact office immediately if you notice worsening of mood or abrupt mood changes.    Please monitor blood pressure and heart rate with a home blood pressure monitoring device upon starting this medication at least 3x/week.      All anti-obesity medication are contraindicated in pregnancy.

## 2023-12-19 NOTE — ASSESSMENT & PLAN NOTE
Encourage adherence with CPAP and regular follow up with sleep medicine.  Reviewed impact of sleep, untreated KERRY on disease process of obesity including effects on appetite hormone regulation.

## 2023-12-26 ENCOUNTER — DOCUMENTATION (OUTPATIENT)
Dept: SLEEP MEDICINE | Facility: CLINIC | Age: 48
End: 2023-12-26
Payer: COMMERCIAL

## 2023-12-26 NOTE — PROGRESS NOTES
Reviewed overnight pulse ox on PAP 12/20/23- showing highest SpO2 99%, lowest SpO2 89%, mean SpO2 94.8%. No need for supplemental oxygen through CPAP. Called patient and informed her of normal results.

## 2024-01-02 ENCOUNTER — SOCIAL WORK (OUTPATIENT)
Dept: BEHAVIORAL HEALTH | Facility: CLINIC | Age: 49
End: 2024-01-02
Payer: COMMERCIAL

## 2024-01-02 DIAGNOSIS — F43.10 PTSD (POST-TRAUMATIC STRESS DISORDER): ICD-10-CM

## 2024-01-02 DIAGNOSIS — F41.9 ANXIETY AND DEPRESSION: ICD-10-CM

## 2024-01-02 DIAGNOSIS — F41.8 ANXIETY WITH DEPRESSION: ICD-10-CM

## 2024-01-02 DIAGNOSIS — F32.A ANXIETY AND DEPRESSION: ICD-10-CM

## 2024-01-02 PROCEDURE — 90837 PSYTX W PT 60 MINUTES: CPT | Performed by: SOCIAL WORKER

## 2024-01-02 NOTE — PROGRESS NOTES
"TIME:   11am-12pm    LOCATION:   Home (Virtual).    CHIEF COMPLAINT:  Severe family issues impacting mood and functioning.    DIAGNOSES:  Anxiety and Depression (F41.9 and F32.A)  Posttraumatic Stress Disorder (F43.10)    TREATMENT MODALITY:  EMDR Therapy, Supportive. 2-4 x month.    SYMPTOMS:    Client continues to struggle with trauma related symptoms, avoidance negative cognitions, arousal/reactivity and functional impairment.     MENTAL STATUS  Client was oriented to person, place and time.  Mood/affect: Euthymic.     NARRATIVE:  Recent events explored; client reports both parents forgot her birthday and met with parenting coordinator. Processed concerns and provided support.    PLAN::  Resolve trauma and related negative cognitions, emotional charge and somatic distress. Possible future target:  \"It's my fault,\" or \"I'm responsible.\"  "

## 2024-01-16 ENCOUNTER — APPOINTMENT (OUTPATIENT)
Dept: PRIMARY CARE | Facility: CLINIC | Age: 49
End: 2024-01-16
Payer: COMMERCIAL

## 2024-01-16 ENCOUNTER — SOCIAL WORK (OUTPATIENT)
Dept: BEHAVIORAL HEALTH | Facility: CLINIC | Age: 49
End: 2024-01-16
Payer: COMMERCIAL

## 2024-01-16 DIAGNOSIS — F32.A ANXIETY AND DEPRESSION: ICD-10-CM

## 2024-01-16 DIAGNOSIS — F43.10 POSTTRAUMATIC STRESS DISORDER: ICD-10-CM

## 2024-01-16 DIAGNOSIS — F41.9 ANXIETY AND DEPRESSION: ICD-10-CM

## 2024-01-16 PROCEDURE — 90837 PSYTX W PT 60 MINUTES: CPT | Performed by: SOCIAL WORKER

## 2024-01-16 NOTE — PROGRESS NOTES
TIME:   2-3pm    LOCATION:   Home (Virtual).    CHIEF COMPLAINT:  Severe family issues impacting mood and functioning.    DIAGNOSES:  Anxiety and Depression (F41.9 and F32.A)  Posttraumatic Stress Disorder (F43.10)    TREATMENT MODALITY:  EMDR Therapy, Supportive. 2-4 x month.    SYMPTOMS:    Client continues to struggle with trauma related symptoms, avoidance negative cognitions, arousal/reactivity and functional impairment.     MENTAL STATUS  Client was oriented to person, place and time.  Mood/affect: Irritated.   SI/HI: Denied.    NARRATIVE:  Processed recent events; no desensitization today, per client, while she relates feeling trapped by current situation with ex and the kids. Explored options and provided support.    PLAN: Work through unresolved issues and provide support.

## 2024-01-30 ENCOUNTER — LAB REQUISITION (OUTPATIENT)
Dept: LAB | Facility: HOSPITAL | Age: 49
End: 2024-01-30

## 2024-01-30 ENCOUNTER — APPOINTMENT (OUTPATIENT)
Dept: BEHAVIORAL HEALTH | Facility: CLINIC | Age: 49
End: 2024-01-30
Payer: COMMERCIAL

## 2024-01-30 DIAGNOSIS — U07.1 COVID: Primary | ICD-10-CM

## 2024-01-30 LAB — SARS-COV-2 RNA RESP QL NAA+PROBE: DETECTED

## 2024-01-30 PROCEDURE — 87635 SARS-COV-2 COVID-19 AMP PRB: CPT

## 2024-01-30 RX ORDER — NIRMATRELVIR AND RITONAVIR 300-100 MG
3 KIT ORAL 2 TIMES DAILY
Qty: 30 TABLET | Refills: 0 | Status: SHIPPED | OUTPATIENT
Start: 2024-01-30 | End: 2024-02-05

## 2024-01-30 NOTE — PROGRESS NOTES
Patient sent [employer] message with positive covid result. Ask provider for advice in regards of medication. Provider notified, order in place. Patient informed if symptoms are mild medication may not be necessary.

## 2024-01-31 ENCOUNTER — PHARMACY VISIT (OUTPATIENT)
Dept: PHARMACY | Facility: CLINIC | Age: 49
End: 2024-01-31

## 2024-01-31 PROCEDURE — RXMED WILLOW AMBULATORY MEDICATION CHARGE

## 2024-02-03 ENCOUNTER — TELEMEDICINE CLINICAL SUPPORT (OUTPATIENT)
Dept: PRIMARY CARE | Facility: CLINIC | Age: 49
End: 2024-02-03
Payer: COMMERCIAL

## 2024-02-03 DIAGNOSIS — N30.00 ACUTE CYSTITIS WITHOUT HEMATURIA: Primary | ICD-10-CM

## 2024-02-03 PROCEDURE — 99213 OFFICE O/P EST LOW 20 MIN: CPT | Performed by: FAMILY MEDICINE

## 2024-02-03 RX ORDER — CIPROFLOXACIN 500 MG/1
500 TABLET ORAL 2 TIMES DAILY
Qty: 14 TABLET | Refills: 0 | Status: SHIPPED | OUTPATIENT
Start: 2024-02-03 | End: 2024-02-10

## 2024-02-03 ASSESSMENT — ENCOUNTER SYMPTOMS
FREQUENCY: 1
DYSURIA: 1

## 2024-02-03 NOTE — PROGRESS NOTES
Subjective   Patient ID: Paradise Brothers is a 48 y.o. female who presents for No chief complaint on file..  Chief Complaint_[facility]:  An interactive audio and video telecommunication system which permits real time communications between the patient (at the originating site) and provider (at the distant site) was utilized to provide this telehealth service.   Virtual or Telephone Consent    An interactive audio and video telecommunication system which permits real time communications between the patient (at the originating site) and provider (at the distant site) was utilized to provide this telehealth service.   Verbal consent was requested and obtained from Paradise Brothers on this date, 02/03/24 for a telehealth visit.    HPI   Pt has covid and now has frequency and discomfort w urination  Denies flank pain  No d,v  Review of Systems   Genitourinary:  Positive for dysuria and frequency.   All other systems reviewed and are negative.      Objective   There were no vitals taken for this visit.    Physical Exam  Constitutional:       Appearance: Normal appearance.   HENT:      Head: Normocephalic.   Pulmonary:      Effort: Pulmonary effort is normal.   Neurological:      General: No focal deficit present.      Mental Status: She is alert and oriented to person, place, and time.   Psychiatric:         Mood and Affect: Mood normal.         Behavior: Behavior normal.         Assessment/Plan   Diagnoses and all orders for this visit:  Acute cystitis without hematuria  -     ciprofloxacin (Cipro) 500 mg tablet; Take 1 tablet (500 mg) by mouth 2 times a day for 7 days.

## 2024-02-06 NOTE — PROGRESS NOTES
Paradise Brothers is a 48 y.o. female presenting for comprehensive weight management follow up.    Has been exercising 30 minutes per day.  Walking 30 minutes per day.  3x/week bodyweight exercise.    Estimates she is eating 1900 calories on average with 40% protein and split carb/fat she is not losing weight.  She has been doing this over the last year with minimal change in weight.    Opted to add naltrexone to already in use bupropion and metformin at last visit due to hx insulin resistance.  No AOM covered by Rx plan and patient is considering bariatric surgical interventions.       Onset of Obesity:   She endorses beginning to struggle with weight in mid 20s, was always active/athletic in high school and in late 20s became less active. She had children in early 30s, endorses a lot of work/familial/life stress around this time which she attribute to some weight gain period. At age 38, went through divorce, lost a fair amount weight. Early 40s underwent hysterectomy for endometrial hyperplasia and began gaining more weight following surgery. In Fall 2020 began struggling with more mobility issues and neuropathic pain in legs, had gained up to 292 with this which she attributes to increased inflammation  HW- 292  LW- 153      Previous Weight Management programs tried: 10k steps daily + 1800cal diet+ RD (9/2021-present)   Most weight loss success: #10      Self ID dietary challenge areas: portion control   Fast food: None  Frequency of added sugar beverages: None      Social Hx: Single   Lives with: 2 children   Employment: Medical physicist     Patient Active Problem List   Diagnosis    Headache    Closed fracture of metatarsal bone    Depression, major, recurrent, in remission (CMS/HCC)    Elevated fasting glucose    Fatigue    Gross hematuria    Hematuria    ADHD (attention deficit hyperactivity disorder)    Anxiety and depression    Other reactions to severe stress    Persistent depressive disorder with anxious  distress, currently moderate    Posttraumatic stress disorder    Astigmatism of both eyes    Abnormal finding on mammography    Acne vulgaris    History of claustrophobia    Hyperlipidemia    Hyperopia of both eyes    Hypersomnia    Hypothyroidism    Injury of head and neck    Patellofemoral stress syndrome of left knee    Quadriceps tendonitis    Subluxation of left patella    Transient left leg weakness    Dysuria    Urine incontinence    Vitamin D deficiency    Weakness of limb    Weight gain    Insulin resistance syndrome    Neuropathy, leg    Nightmares associated with chronic post-traumatic stress disorder    Obstructive sleep apnea    BMI 45.0-49.9, adult (CMS/[facility])    Elevated BP without diagnosis of hypertension    Nonsmoker    Dietary counseling       Past Surgical History:   Procedure Laterality Date    OTHER SURGICAL HISTORY  03/12/2020    Shoulder arthroscopy    OTHER SURGICAL HISTORY  10/12/2021    Total vaginal hysterectomy    TONSILLECTOMY  07/25/2013    Tonsillectomy        Social History     Socioeconomic History    Marital status: Single     Spouse name: Not on file    Number of children: Not on file    Years of education: Not on file    Highest education level: Not on file   Occupational History    Not on file   Tobacco Use    Smoking status: Never    Smokeless tobacco: Never   Substance and Sexual Activity    Alcohol use: Yes     Comment: rare    Drug use: Never    Sexual activity: Not on file   Other Topics Concern    Not on file   Social History Narrative    Not on file     Social Determinants of Health     Financial Resource Strain: Not on file   Food Insecurity: Not on file   Transportation Needs: Not on file   Physical Activity: Not on file   Stress: Not on file   Social Connections: Not on file   Intimate Partner Violence: Not on file   Housing Stability: Not on file        Family History   Problem Relation Name Age of Onset    No Known Problems Mother      No Known Problems Father           REVIEW OF SYSTEMS:  Negative unless otherwise reviewed in HPI    PHYSICAL EXAM  There were no vitals taken for this visit.  Alert, well appearing, no acute distress, nourished, hydrated.  Anicteric sclera, no ptosis  Facial symmetry  Neck supple  Unlabored respirations.  Easily conversant without increased respiratory effort  Oriented to person, place, time.  Judgement intact.  Appropriate mood, affect.       ASSESSMENT & PLAN:  Paradise Brothers is presenting for an obesity management visit.    Current BMI: There is no height or weight on file to calculate BMI.   Today's weight:   Wt Readings from Last 1 Encounters:   12/19/23 130 kg (287 lb)        Problem List Items Addressed This Visit    None        > 50% of time spent counseling on lifestyle modifications to support weight loss.

## 2024-02-07 ENCOUNTER — TELEPHONE (OUTPATIENT)
Dept: SURGERY | Facility: CLINIC | Age: 49
End: 2024-02-07

## 2024-02-07 NOTE — TELEPHONE ENCOUNTER
Spoke with patient, name and  verified:     Patient would like to change appointment date with MICHAEL Aniyah, message sent to provider regarding this matter.

## 2024-02-12 ENCOUNTER — APPOINTMENT (OUTPATIENT)
Dept: SURGERY | Facility: CLINIC | Age: 49
End: 2024-02-12
Payer: COMMERCIAL

## 2024-02-13 ENCOUNTER — SOCIAL WORK (OUTPATIENT)
Dept: BEHAVIORAL HEALTH | Facility: CLINIC | Age: 49
End: 2024-02-13
Payer: COMMERCIAL

## 2024-02-13 DIAGNOSIS — F43.89 OTHER REACTIONS TO SEVERE STRESS: ICD-10-CM

## 2024-02-13 PROCEDURE — 90837 PSYTX W PT 60 MINUTES: CPT | Performed by: SOCIAL WORKER

## 2024-02-13 NOTE — PROGRESS NOTES
"TIME:   11am-12:pm    DIAGNOSES:  Other reactions to Severe Stress    TREATMENT MODALITY:  EMDR Therapy,   Supportive.   2-4 x month.    NARRATIVE:  Client reports she is currently not taking her medications due to having covid, and will followup with prescriber re this.  SI denied, sleeping better, nightmares denied. Can get anxious, with underlying sense of urgency and  tension.  She reports recent bad experience with covid, and that she felt vulnerable due to being by herself. While she reports people checked in with her she relates no one was there to help her out.    Meanwhile she reports handling a recent issue with her ex better, in that she didn't take on what what was her ex's responsibility in trying to get passport.  She also reports things have been going well with her son S, although she notes he's missed \"a ton of school,\" which occurs when he's staying with her ex.    Client also reports recent court appearance resulted in the need to wait for her ex's atty to provide the language for the agreement regarding the visitation modification. In general, client indicates she is more accepting and is staying out of her ex and her son N's way, and that her mood has been better, also with some sadness due to missing N. and is leaving the door open for them to have a relationship.    PLAN: Work through unresolved issues and provide support.  "

## 2024-02-14 NOTE — TELEPHONE ENCOUNTER
Unable to reach patient, left voicemail message for patient to return the call.     Called patient to reschedule missed visit with MICHAEL Aniyah, pt will follow-up with MICHAEL Arias.

## 2024-02-20 ENCOUNTER — APPOINTMENT (OUTPATIENT)
Dept: PRIMARY CARE | Facility: CLINIC | Age: 49
End: 2024-02-20
Payer: COMMERCIAL

## 2024-02-27 ENCOUNTER — APPOINTMENT (OUTPATIENT)
Dept: BEHAVIORAL HEALTH | Facility: CLINIC | Age: 49
End: 2024-02-27
Payer: COMMERCIAL

## 2024-02-29 ENCOUNTER — SOCIAL WORK (OUTPATIENT)
Dept: BEHAVIORAL HEALTH | Facility: CLINIC | Age: 49
End: 2024-02-29
Payer: COMMERCIAL

## 2024-02-29 DIAGNOSIS — F43.89 OTHER REACTIONS TO SEVERE STRESS: ICD-10-CM

## 2024-02-29 PROCEDURE — 90837 PSYTX W PT 60 MINUTES: CPT | Performed by: SOCIAL WORKER

## 2024-02-29 NOTE — PROGRESS NOTES
TIME:   8-9am    DIAGNOSES:  Other reactions to Severe Stress    TREATMENT MODALITY:  EMDR Therapy,   Supportive.   2-4 x month.    NARRATIVE:  Client indicates despite challenges in her personal life and at work she is managing well; she reports she is sleeping well, not having nightmares, is developing a new project for work that will develop her skill set and is finding time to study for the final board exam; she is not taking any medications and has not followed up with prescriber, noting some hesitance to take something she is unclear if she needs. Explored  impact of less engagement with son ALIX, concerns about his mental health and son S's gastrointestinal issues; also explored strategies for improved connection with N as well as options to reduce stress for herself and the kids.    PLAN: Work through unresolved issues and provide support.

## 2024-03-04 ENCOUNTER — LAB (OUTPATIENT)
Dept: LAB | Facility: LAB | Age: 49
End: 2024-03-04
Payer: COMMERCIAL

## 2024-03-04 DIAGNOSIS — M25.50 ARTHRALGIA, UNSPECIFIED JOINT: ICD-10-CM

## 2024-03-04 DIAGNOSIS — R63.5 WEIGHT GAIN: ICD-10-CM

## 2024-03-04 DIAGNOSIS — R73.03 PREDIABETES: ICD-10-CM

## 2024-03-04 DIAGNOSIS — E66.01 MORBID OBESITY (MULTI): ICD-10-CM

## 2024-03-04 DIAGNOSIS — E88.810 INSULIN RESISTANCE SYNDROME: ICD-10-CM

## 2024-03-04 DIAGNOSIS — R53.83 OTHER FATIGUE: ICD-10-CM

## 2024-03-04 LAB
ALBUMIN SERPL BCP-MCNC: 4.7 G/DL (ref 3.4–5)
ALP SERPL-CCNC: 102 U/L (ref 33–110)
ALT SERPL W P-5'-P-CCNC: 28 U/L (ref 7–45)
ANION GAP SERPL CALC-SCNC: 15 MMOL/L (ref 10–20)
AST SERPL W P-5'-P-CCNC: 19 U/L (ref 9–39)
BILIRUB SERPL-MCNC: 0.6 MG/DL (ref 0–1.2)
BUN SERPL-MCNC: 16 MG/DL (ref 6–23)
CALCIUM SERPL-MCNC: 10 MG/DL (ref 8.6–10.6)
CHLORIDE SERPL-SCNC: 105 MMOL/L (ref 98–107)
CHOLEST SERPL-MCNC: 228 MG/DL (ref 0–199)
CHOLESTEROL/HDL RATIO: 4.6
CO2 SERPL-SCNC: 25 MMOL/L (ref 21–32)
CREAT SERPL-MCNC: 0.77 MG/DL (ref 0.5–1.05)
EGFRCR SERPLBLD CKD-EPI 2021: >90 ML/MIN/1.73M*2
ERYTHROCYTE [DISTWIDTH] IN BLOOD BY AUTOMATED COUNT: 12.4 % (ref 11.5–14.5)
EST. AVERAGE GLUCOSE BLD GHB EST-MCNC: 97 MG/DL
GLUCOSE SERPL-MCNC: 90 MG/DL (ref 74–99)
HBA1C MFR BLD: 5 %
HCT VFR BLD AUTO: 43 % (ref 36–46)
HDLC SERPL-MCNC: 49.1 MG/DL
HGB BLD-MCNC: 14.6 G/DL (ref 12–16)
INSULIN P FAST SERPL-ACNC: 13 UIU/ML (ref 3–25)
LDLC SERPL CALC-MCNC: 153 MG/DL
MCH RBC QN AUTO: 29 PG (ref 26–34)
MCHC RBC AUTO-ENTMCNC: 34 G/DL (ref 32–36)
MCV RBC AUTO: 86 FL (ref 80–100)
NON HDL CHOLESTEROL: 179 MG/DL (ref 0–149)
NRBC BLD-RTO: 0 /100 WBCS (ref 0–0)
PLATELET # BLD AUTO: 285 X10*3/UL (ref 150–450)
POTASSIUM SERPL-SCNC: 4.4 MMOL/L (ref 3.5–5.3)
PROT SERPL-MCNC: 7.6 G/DL (ref 6.4–8.2)
RBC # BLD AUTO: 5.03 X10*6/UL (ref 4–5.2)
SODIUM SERPL-SCNC: 141 MMOL/L (ref 136–145)
TRIGL SERPL-MCNC: 128 MG/DL (ref 0–149)
TSH SERPL-ACNC: 2.88 MIU/L (ref 0.44–3.98)
URATE SERPL-MCNC: 6.8 MG/DL (ref 2.3–6.7)
VLDL: 26 MG/DL (ref 0–40)
WBC # BLD AUTO: 7 X10*3/UL (ref 4.4–11.3)

## 2024-03-04 PROCEDURE — 85652 RBC SED RATE AUTOMATED: CPT

## 2024-03-04 PROCEDURE — 84443 ASSAY THYROID STIM HORMONE: CPT

## 2024-03-04 PROCEDURE — 86038 ANTINUCLEAR ANTIBODIES: CPT

## 2024-03-04 PROCEDURE — 85027 COMPLETE CBC AUTOMATED: CPT

## 2024-03-04 PROCEDURE — 36415 COLL VENOUS BLD VENIPUNCTURE: CPT

## 2024-03-04 PROCEDURE — 80061 LIPID PANEL: CPT

## 2024-03-04 PROCEDURE — 84550 ASSAY OF BLOOD/URIC ACID: CPT

## 2024-03-04 PROCEDURE — 83525 ASSAY OF INSULIN: CPT

## 2024-03-04 PROCEDURE — 83036 HEMOGLOBIN GLYCOSYLATED A1C: CPT

## 2024-03-04 PROCEDURE — 80053 COMPREHEN METABOLIC PANEL: CPT

## 2024-03-04 PROCEDURE — 86140 C-REACTIVE PROTEIN: CPT

## 2024-03-05 ENCOUNTER — OFFICE VISIT (OUTPATIENT)
Dept: PRIMARY CARE | Facility: CLINIC | Age: 49
End: 2024-03-05
Payer: COMMERCIAL

## 2024-03-05 VITALS
DIASTOLIC BLOOD PRESSURE: 94 MMHG | WEIGHT: 281 LBS | BODY MASS INDEX: 45.16 KG/M2 | SYSTOLIC BLOOD PRESSURE: 131 MMHG | HEART RATE: 70 BPM | HEIGHT: 66 IN

## 2024-03-05 DIAGNOSIS — R03.0 ELEVATED BP WITHOUT DIAGNOSIS OF HYPERTENSION: ICD-10-CM

## 2024-03-05 DIAGNOSIS — S83.002S SUBLUXATION OF LEFT PATELLA, SEQUELA: ICD-10-CM

## 2024-03-05 DIAGNOSIS — R63.5 WEIGHT GAIN: Primary | ICD-10-CM

## 2024-03-05 DIAGNOSIS — F32.A ANXIETY AND DEPRESSION: ICD-10-CM

## 2024-03-05 DIAGNOSIS — F43.10 POSTTRAUMATIC STRESS DISORDER: ICD-10-CM

## 2024-03-05 DIAGNOSIS — M25.50 ARTHRALGIA, UNSPECIFIED JOINT: ICD-10-CM

## 2024-03-05 DIAGNOSIS — E55.9 VITAMIN D DEFICIENCY: ICD-10-CM

## 2024-03-05 DIAGNOSIS — E03.9 HYPOTHYROIDISM, UNSPECIFIED TYPE: ICD-10-CM

## 2024-03-05 DIAGNOSIS — E78.5 HYPERLIPIDEMIA, UNSPECIFIED HYPERLIPIDEMIA TYPE: ICD-10-CM

## 2024-03-05 DIAGNOSIS — F41.9 ANXIETY AND DEPRESSION: ICD-10-CM

## 2024-03-05 DIAGNOSIS — E88.810 INSULIN RESISTANCE SYNDROME: ICD-10-CM

## 2024-03-05 PROBLEM — R73.01 ELEVATED FASTING GLUCOSE: Status: RESOLVED | Noted: 2023-10-29 | Resolved: 2024-03-05

## 2024-03-05 LAB
CRP SERPL-MCNC: 0.83 MG/DL
ERYTHROCYTE [SEDIMENTATION RATE] IN BLOOD BY WESTERGREN METHOD: 21 MM/H (ref 0–20)

## 2024-03-05 PROCEDURE — 99396 PREV VISIT EST AGE 40-64: CPT | Performed by: INTERNAL MEDICINE

## 2024-03-05 PROCEDURE — 3008F BODY MASS INDEX DOCD: CPT | Performed by: INTERNAL MEDICINE

## 2024-03-05 PROCEDURE — 90686 IIV4 VACC NO PRSV 0.5 ML IM: CPT | Performed by: INTERNAL MEDICINE

## 2024-03-05 PROCEDURE — 1036F TOBACCO NON-USER: CPT | Performed by: INTERNAL MEDICINE

## 2024-03-05 PROCEDURE — 90471 IMMUNIZATION ADMIN: CPT | Performed by: INTERNAL MEDICINE

## 2024-03-05 ASSESSMENT — PAIN SCALES - GENERAL: PAINLEVEL: 5

## 2024-03-05 NOTE — PROGRESS NOTES
Subjective   Patient ID:   [id number]   [id number]   Paradise Brothers is a 48 y.o. female who presents for No chief complaint on file..  HPI    48 year old female here for annual exam.  At the end of January she got COVID and took paxlovid.  She had fever, myalgias, headache, diffuse pain, sore throat, loss of taste and smell, night sweats, fatigue.  She did not have cough or respiratory symptoms.  When she was on the paxlovid all of her joint pains resolved.  Her sons are 15 year old and at different schools, Adrian is at [employer], Cecilio is at [employer].  She has regular therapist who helps her manage some of the domestic issues.  She tries to minimize contact with Silvia and communicate through writing if possible. They are back in court.  She is working with a dietician outside of .  She takes 2 half hour walks a day unless it is icy. She maxed out at 287 pounds and is now down at 281 pounds.  She is on Welbutrin and they added Naloxone but not clear made a difference and then her provider left.  She is still interested in using a GLP1 agonist.  Her left knee and quad are still an issue for her and she needs to be careful.      ROS were reviewed and are negative with the exception of what is noted in HPI    There were no vitals taken for this visit.  Objective   Physical Exam  Constitutional:       General: She is not in acute distress.     Appearance: She is obese.   HENT:      Head: Normocephalic and atraumatic.      Right Ear: Tympanic membrane normal.      Left Ear: Tympanic membrane normal.      Mouth/Throat:      Mouth: Mucous membranes are moist.   Eyes:      Extraocular Movements: Extraocular movements intact.      Pupils: Pupils are equal, round, and reactive to light.   Cardiovascular:      Rate and Rhythm: Normal rate and regular rhythm.      Heart sounds: No murmur heard.     No friction rub. No gallop.   Pulmonary:      Effort: Pulmonary effort is normal.      Breath  sounds: No wheezing, rhonchi or rales.   Abdominal:      Palpations: Abdomen is soft.      Tenderness: There is no abdominal tenderness.   Musculoskeletal:         General: No swelling.      Cervical back: Neck supple.      Comments: Pain lateral left knee to palpation, no crepitus, no fluid bulge   Lymphadenopathy:      Cervical: No cervical adenopathy.   Neurological:      Mental Status: She is alert.         Assessment/Plan   Problem List Items Addressed This Visit    None         Provider Impressions     1. left leg weakness - complete neurologic workup was negative. SEems to be suprapatellar and related to her knee. SHe is doing better walking her new dog, Rylee. REferral to ortho Salata again to see if he has ideas.  Suspect the quad weakness is related to the knee.  Interesting that her joint discomfort eased on paxlovid and returned with stopping.    2. Fatigue - actually doing a bit better using CPAP, still with lots of personal stressors.   3. Borderline hypertension - she has continued to lose weight and she will follow her blood pressure at home.  Our goal is <130/80.  Losing weight, regular exercise and eating a better diet should all be helpful.    4. Depression - she has good support with psychiatry. On bupropion. MAny life stressors primarily related to her sons and  partner and the stresses in trying to remain in her parenting role for her sons.   5. KERRY - she has CPAP mask at home now.   6. weight- she saw endocrine and the Wegovy was denied. She did not realize it was ordered as would have considered using HSA savings to try the medication.  SHe will return to endocrine to try again.  Recheck her hemoglobin A1C and feel if her number is elevated would be able to prescribe the GLP1 agonist for control  7. Vitamin D -  8. Foot fracture - doing better  9. Facial rash - rosacea got worse after COVID. To derm   10. shoulder repair - better  11. Health maintenance   - full PE 3/24 today,.    -  PAP (s/p hysterectomy, one ovary intact)   -mammo ordered with repeat ultrasound if indicated  -Colonoscopy ordered   - immunizations:  flu shot today  Jazmyne Herrera MD

## 2024-03-05 NOTE — PATIENT INSTRUCTIONS
Patient Discussion/Summary     1. good to see you   2. So many life stressors. Glad you are accessing resources to help you cope and manage  3. Referral to Finn Guajardo to see if he has recommendations about the knee  4. Colonoscopy ordered  5. Labs reviewed and ordered some inflammatory markers  6. Mammogram ordered with ultrasound if appropraite  7. Good luck on studying for your exam.   8.  Try the [employer] cards to help with studying  9.  Referral to endocrine to see if they can support the GLP1 agonist effort  10.  Great job with the dietician and love the CGM and how you are learning about how the body works with food.    11.  Lets check in at 6 month interval

## 2024-03-06 LAB — ANA SER QL HEP2 SUBST: NEGATIVE

## 2024-03-18 ENCOUNTER — TELEPHONE (OUTPATIENT)
Dept: ENDOCRINOLOGY | Facility: CLINIC | Age: 49
End: 2024-03-18
Payer: COMMERCIAL

## 2024-03-18 NOTE — TELEPHONE ENCOUNTER
Patient called; exposed to flu B last week; she is getting worse; feels like she has gravel in her chest; pls call her at [phone]

## 2024-03-19 ENCOUNTER — APPOINTMENT (OUTPATIENT)
Dept: PRIMARY CARE | Facility: CLINIC | Age: 49
End: 2024-03-19
Payer: COMMERCIAL

## 2024-03-19 ENCOUNTER — TELEPHONE (OUTPATIENT)
Dept: INTERNAL MEDICINE | Facility: HOSPITAL | Age: 49
End: 2024-03-19

## 2024-03-19 DIAGNOSIS — J45.909 MILD REACTIVE AIRWAYS DISEASE, UNSPECIFIED WHETHER PERSISTENT (HHS-HCC): Primary | ICD-10-CM

## 2024-03-19 RX ORDER — BENZONATATE 200 MG/1
200 CAPSULE ORAL 3 TIMES DAILY PRN
Qty: 20 CAPSULE | Refills: 1 | Status: SHIPPED | OUTPATIENT
Start: 2024-03-19 | End: 2024-03-26

## 2024-03-19 RX ORDER — ALBUTEROL SULFATE 90 UG/1
2 AEROSOL, METERED RESPIRATORY (INHALATION) EVERY 4 HOURS PRN
Status: SHIPPED | OUTPATIENT
Start: 2024-03-19

## 2024-03-20 NOTE — TELEPHONE ENCOUNTER
Patient exposed to flu B and has had fever, cough, chills and feels rotten.  She is starting to improve.  Her symptoms started last Wednesday, 7 days ago.  She had her flu shot on Tuesday before she had the exposure.  Will prescribe her albuterol and tessalon perles and she knows to go to urgent care if her breathing or symptoms get worse as can develop secondary bacterial infection.

## 2024-03-21 ENCOUNTER — PHARMACY VISIT (OUTPATIENT)
Dept: PHARMACY | Facility: CLINIC | Age: 49
End: 2024-03-21
Payer: COMMERCIAL

## 2024-03-22 DIAGNOSIS — Z12.11 COLON CANCER SCREENING: Primary | ICD-10-CM

## 2024-03-22 RX ORDER — SODIUM PICOSULFATE, MAGNESIUM OXIDE, AND ANHYDROUS CITRIC ACID 12; 3.5; 1 G/175ML; G/175ML; MG/175ML
LIQUID ORAL
Qty: 350 ML | Refills: 0 | Status: SHIPPED | OUTPATIENT
Start: 2024-03-22

## 2024-03-25 ENCOUNTER — PHARMACY VISIT (OUTPATIENT)
Dept: PHARMACY | Facility: CLINIC | Age: 49
End: 2024-03-25
Payer: COMMERCIAL

## 2024-04-15 ENCOUNTER — SOCIAL WORK (OUTPATIENT)
Dept: BEHAVIORAL HEALTH | Facility: CLINIC | Age: 49
End: 2024-04-15
Payer: COMMERCIAL

## 2024-04-15 DIAGNOSIS — F43.89 OTHER REACTIONS TO SEVERE STRESS: ICD-10-CM

## 2024-04-15 PROCEDURE — 90837 PSYTX W PT 60 MINUTES: CPT | Performed by: SOCIAL WORKER

## 2024-04-15 NOTE — PROGRESS NOTES
TIME:   5-6pm    PATIENT LOCATION:  Home (virtual)    SYMPTOMS:  Persistent disturbance in connection with family relationships and occupational stressors.    FUNCTIONAL STATUS & PROGRESS SINCE PREVIOUS ENCOUNTER:  Settlement conf canceled since now ct has new atty, Marcos Alexander. Studying; socializing when can; increase in understanding about her mother with clearer insight, but also with increase in feeling lonely, untethered and anxious about the future. She says she continues to have a good relationship with son S while sad about the disconnect with son N; socially, she says her good friends are thinking of moving to [place], she is connecting more with the "[employer] and is developing a friendship with a coworker. She notes work is still challenging and overwhelming at times, she is invested differently, limiting her efforts to work hours.      TREATMENT PLAN:  Work through unresolved issues, develop options to navigate challenges and provide support.    TREATMENT MODALITY:  EMDR Therapy,   Supportive.   2-4 x month.

## 2024-05-02 ENCOUNTER — APPOINTMENT (OUTPATIENT)
Dept: GASTROENTEROLOGY | Facility: HOSPITAL | Age: 49
End: 2024-05-02
Payer: COMMERCIAL

## 2024-05-06 ENCOUNTER — SOCIAL WORK (OUTPATIENT)
Dept: BEHAVIORAL HEALTH | Facility: CLINIC | Age: 49
End: 2024-05-06
Payer: COMMERCIAL

## 2024-05-06 DIAGNOSIS — F43.89 OTHER REACTIONS TO SEVERE STRESS: ICD-10-CM

## 2024-05-06 PROCEDURE — 90837 PSYTX W PT 60 MINUTES: CPT | Performed by: SOCIAL WORKER

## 2024-05-06 NOTE — PROGRESS NOTES
TIME:   2-3pm    PATIENT LOCATION:  Home (virtual)    SYMPTOMS:  Persistent disturbance in connection with family relationships and occupational stressors.    FUNCTIONAL STATUS & PROGRESS SINCE PREVIOUS ENCOUNTER:  Was able to call dad after dismay and worry connected with boss' response to client getting raise she fought for.  She notes she handled it better.    TREATMENT PLAN:  Work through unresolved issues, develop options to navigate challenges and provide support.    TREATMENT MODALITY:  EMDR Therapy,   Supportive.   2-4 x month.

## 2024-05-15 ENCOUNTER — SOCIAL WORK (OUTPATIENT)
Dept: BEHAVIORAL HEALTH | Facility: CLINIC | Age: 49
End: 2024-05-15
Payer: COMMERCIAL

## 2024-05-15 DIAGNOSIS — F43.89 OTHER REACTIONS TO SEVERE STRESS: ICD-10-CM

## 2024-05-15 PROCEDURE — 90837 PSYTX W PT 60 MINUTES: CPT | Performed by: SOCIAL WORKER

## 2024-05-15 NOTE — PROGRESS NOTES
"TIME:   11am-12pm    PATIENT LOCATION:  Home (virtual)    SYMPTOMS:  Persistent disturbance in connection with family relationships and occupational stressors.    FUNCTIONAL STATUS & PROGRESS SINCE PREVIOUS ENCOUNTER:  Last minute session after client reached out. Client dealing with overwhelm with \"thoughts of not being here;\" She relates multiple concerns involving her son (S) who is missing school; she  relates desire for S to live with her on school days to reduce current problems with his academic performance.  Client also relates work has been difficult and involving extremely long hours, and she has been struggling to find the time and energy to study for [employer]; she is open to exploring ways to delegate tasks so free her up to study more.  In general she is also distressed about always having to rely on herself and those could but don't help.     TREATMENT PLAN:  Work through unresolved issues, develop options to navigate challenges and provide support.    TREATMENT MODALITY:  EMDR Therapy,   Supportive.   2-4 x month.  "

## 2024-05-17 ENCOUNTER — SOCIAL WORK (OUTPATIENT)
Dept: BEHAVIORAL HEALTH | Facility: CLINIC | Age: 49
End: 2024-05-17
Payer: COMMERCIAL

## 2024-05-17 DIAGNOSIS — F43.89 OTHER REACTIONS TO SEVERE STRESS: ICD-10-CM

## 2024-05-17 PROCEDURE — 90837 PSYTX W PT 60 MINUTES: CPT | Performed by: SOCIAL WORKER

## 2024-05-17 NOTE — PROGRESS NOTES
TIME:   10-11am    PATIENT LOCATION:  Home (virtual)    SYMPTOMS:  Persistent disturbance in connection with family relationships and occupational stressors.    FUNCTIONAL STATUS AND PROGRESS SINCE PREVIOUS ENCOUNTER:  Bought additional supplies to help with studying for [employer].  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  Client continues to struggle with stress generated by management, and has needed to contemplate response to a recent email to her.    TREATMENT PLAN:  Work through unresolved issues, develop options to navigate challenges and provide support.     TREATMENT MODALITY:  EMDR Therapy,   Supportive.   2-4 x month.

## 2024-05-20 ENCOUNTER — DOCUMENTATION (OUTPATIENT)
Dept: PHYSICAL THERAPY | Facility: CLINIC | Age: 49
End: 2024-05-20
Payer: COMMERCIAL

## 2024-05-20 NOTE — PROGRESS NOTES
Physical Therapy    Discharge Summary    Name: Paradise Brothers  MRN: [id number]  : 1975  Date: 24    Discharge Summary: PT    Discharge Information: Date of discharge 2024 and Date of last visit 2023    Therapy Summary: Patient with continued issues with increased tone and tension.    Rehab Discharge Reason: Other Patient has been managing cervical tone independently for > 60 days

## 2024-05-23 ENCOUNTER — SOCIAL WORK (OUTPATIENT)
Dept: BEHAVIORAL HEALTH | Facility: CLINIC | Age: 49
End: 2024-05-23
Payer: COMMERCIAL

## 2024-05-23 DIAGNOSIS — F43.89 OTHER REACTIONS TO SEVERE STRESS: ICD-10-CM

## 2024-05-23 PROCEDURE — 90837 PSYTX W PT 60 MINUTES: CPT | Performed by: SOCIAL WORKER

## 2024-05-23 NOTE — PROGRESS NOTES
"TIME:   8-9am    PATIENT LOCATION:  Home (virtual)    SYMPTOMS:  Persistent disturbance in connection with family relationships and occupational stressors.    FUNCTIONAL STATUS AND PROGRESS SINCE PREVIOUS ENCOUNTER:  Client reports worries about son, S., is frustrated and trying to help him regarding problems with attendance, grades, pressure with AP classes, as well as son N's attendance, grades and that he is not being held accountable. She says ion Tuesday in her frustration in trying to help and due to contending with ex wife who is \"constantly chaotic\" and who offends her moral compass, she cried. Client questions her own decision-making as to why she got involved with her ex wife in the first place, noting she can be hard on herself about that decision because it resulted in so many problems and so much distress.    TREATMENT PLAN:  Work through unresolved issues, develop options to navigate challenges and provide support.     TREATMENT MODALITY:  EMDR Therapy,   Supportive.   2-4 x month.  "

## 2024-06-03 ENCOUNTER — APPOINTMENT (OUTPATIENT)
Dept: BEHAVIORAL HEALTH | Facility: CLINIC | Age: 49
End: 2024-06-03
Payer: COMMERCIAL

## 2024-06-03 DIAGNOSIS — F43.89 OTHER REACTIONS TO SEVERE STRESS: ICD-10-CM

## 2024-06-03 PROCEDURE — 90837 PSYTX W PT 60 MINUTES: CPT | Performed by: SOCIAL WORKER

## 2024-06-19 ENCOUNTER — APPOINTMENT (OUTPATIENT)
Dept: BEHAVIORAL HEALTH | Facility: CLINIC | Age: 49
End: 2024-06-19
Payer: COMMERCIAL

## 2024-06-19 DIAGNOSIS — F43.89 OTHER REACTIONS TO SEVERE STRESS: ICD-10-CM

## 2024-06-19 PROCEDURE — 90837 PSYTX W PT 60 MINUTES: CPT | Performed by: SOCIAL WORKER

## 2024-06-20 NOTE — PROGRESS NOTES
"TIME:   12-1pm    PATIENT LOCATION:  Home (virtual)    SYMPTOMS:  Persistent disturbance in connection with family relationships and occupational stressors.    FUNCTIONAL STATUS AND PROGRESS SINCE PREVIOUS ENCOUNTER:  Client reports a very enjoyable weekend with friends, but relates overwhelm when asked about dating interests due to fear of being hurt; explored related sense of threat and use of \"Regulating Resources Map\" to manage these situations.    TREATMENT PLAN:  Work through unresolved issues, develop options to navigate challenges and provide support.     TREATMENT MODALITY:  EMDR Therapy,   Supportive.   2-4 x month.  "

## 2024-06-23 NOTE — PROGRESS NOTES
TIME:   11am-12pm    PATIENT LOCATION:  Home (virtual)    SYMPTOMS:  Persistent disturbance in connection with family relationships and occupational stressors.    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 Client also indicates some concern about how long the good/happy things will last; explored the transitory aspect of good times and bad as well as the idea of walking with a part when activated, instead of being blended in with it.    TREATMENT PLAN:  Work through unresolved issues, develop options to navigate challenges and provide support.     TREATMENT MODALITY:  EMDR Therapy  IFS  Supportive.   2-4 x month.

## 2024-07-03 ENCOUNTER — APPOINTMENT (OUTPATIENT)
Dept: BEHAVIORAL HEALTH | Facility: CLINIC | Age: 49
End: 2024-07-03
Payer: COMMERCIAL

## 2024-07-03 DIAGNOSIS — F43.89 OTHER REACTIONS TO SEVERE STRESS: ICD-10-CM

## 2024-07-03 PROCEDURE — 90837 PSYTX W PT 60 MINUTES: CPT | Performed by: SOCIAL WORKER

## 2024-07-17 ENCOUNTER — APPOINTMENT (OUTPATIENT)
Dept: BEHAVIORAL HEALTH | Facility: CLINIC | Age: 49
End: 2024-07-17
Payer: COMMERCIAL

## 2024-07-17 DIAGNOSIS — F43.89 OTHER REACTIONS TO SEVERE STRESS: ICD-10-CM

## 2024-07-17 PROCEDURE — 90837 PSYTX W PT 60 MINUTES: CPT | Performed by: SOCIAL WORKER

## 2024-07-17 NOTE — PROGRESS NOTES
TIME:   11am-12pm    PATIENT LOCATION:  Home (virtual)    SYMPTOMS:  Persistent disturbance in connection with family relationships and occupational stressors.    FUNCTIONAL STATUS AND PROGRESS SINCE PREVIOUS ENCOUNTER:  Client relates feeling hesiant to share her background due to the amount of instability, and also relates grief due to  heightened awareness of childhood adversity. She notes triggers can occur in connection of what sh hasn't had and continues to not have in terms of family. While she indicates some concern regarding social relationships she also indicates awareness of all that she's achieved as she's navigated multiple challenges, as well as her ability to set healthy limits with others..     TREATMENT PLAN:  Work through unresolved issues, develop options to navigate challenges and provide support.     TREATMENT MODALITY:  EMDR Therapy  IFS  Supportive.   2-4 x month.

## 2024-07-17 NOTE — PROGRESS NOTES
TIME:   11am-12pm    PATIENT LOCATION:  Home (virtual)    SYMPTOMS:  Persistent disturbance in connection with family relationships and occupational stressors.    FUNCTIONAL STATUS AND PROGRESS SINCE PREVIOUS ENCOUNTER:  Client indicates past experiences continue to impact her socially, with pattern of distrust and desire to keep people at arm's length. Explored concerns and illuminated continuing impact.    TREATMENT PLAN:  Work through unresolved issues, develop options to navigate challenges and provide support.     TREATMENT MODALITY:  Supportive.   EMDR Therapy  IFS  2 x month.

## 2024-07-31 ENCOUNTER — APPOINTMENT (OUTPATIENT)
Dept: BEHAVIORAL HEALTH | Facility: CLINIC | Age: 49
End: 2024-07-31
Payer: COMMERCIAL

## 2024-07-31 DIAGNOSIS — F43.89 OTHER REACTIONS TO SEVERE STRESS: ICD-10-CM

## 2024-07-31 PROCEDURE — 90837 PSYTX W PT 60 MINUTES: CPT | Performed by: SOCIAL WORKER

## 2024-07-31 NOTE — PROGRESS NOTES
TIME:   12-1pm    PATIENT LOCATION:  Home (virtual)    SYMPTOMS:  Persistent disturbance in connection with family relationships and occupational stressors.    FUNCTIONAL STATUS AND PROGRESS SINCE PREVIOUS ENCOUNTER:  Client reports that tomorrow is her test for [employer] and she notes at times panic will take over, although she also reports she has also been able to pull herself out of it through self talk. She then participated in an EFT session as resource for stabilization, following which she reported her stress was clearly reduced.   Client also reports ongoing  distress about son (N). She notes court is coming up on August 22nd and she has talked with her  about a reconciliation counselor, although she knows that even if an order happens, enforcement is an issue, as it has been in the past. Client also related that at times guilt comes up for her regarding the decision to have the kids, in light of the many challenges they have faced.  This was discussed as a potential target for future desensitization.    TREATMENT PLAN:  Work through unresolved issues, develop options to navigate challenges and provide support.     TREATMENT MODALITY:  EMDR Therapy  Supportive  2 x month.

## 2024-08-14 ENCOUNTER — APPOINTMENT (OUTPATIENT)
Dept: BEHAVIORAL HEALTH | Facility: CLINIC | Age: 49
End: 2024-08-14
Payer: COMMERCIAL

## 2024-08-14 DIAGNOSIS — F43.89 OTHER REACTIONS TO SEVERE STRESS: ICD-10-CM

## 2024-08-14 PROCEDURE — 90837 PSYTX W PT 60 MINUTES: CPT | Performed by: SOCIAL WORKER

## 2024-08-14 NOTE — PROGRESS NOTES
TIME:   12-1pm    PATIENT LOCATION:  Home (virtual)    SYMPTOMS:  Persistent disturbance in connection with family relationships and occupational stressors.    FUNCTIONAL STATUS AND PROGRESS SINCE PREVIOUS ENCOUNTER:  Explored mood and functioning; client reports much is going well these days, she is a bit tired due to having friends over last night (first time in a long time), has taken her boards and is prepared to handle results one way or another and has option to retake them if necessary; she also reports her ex filed motion to dismiss; processed sense of guardedness - waiting for the other shoe to drop.     TREATMENT PLAN:  Work through unresolved issues, develop options to navigate challenges and provide support.     TREATMENT MODALITY:  Supportive  EMDR Therapy  2 x month.

## 2024-08-28 ENCOUNTER — APPOINTMENT (OUTPATIENT)
Dept: BEHAVIORAL HEALTH | Facility: CLINIC | Age: 49
End: 2024-08-28
Payer: COMMERCIAL

## 2024-08-28 DIAGNOSIS — F43.89 OTHER REACTIONS TO SEVERE STRESS: ICD-10-CM

## 2024-08-28 PROCEDURE — 90837 PSYTX W PT 60 MINUTES: CPT | Performed by: SOCIAL WORKER

## 2024-09-02 NOTE — PROGRESS NOTES
TIME:   12-1pm    PATIENT LOCATION:  Home (virtual)    SYMPTOMS:  Persistent disturbance in connection with family relationships and occupational stressors.    FUNCTIONAL STATUS AND PROGRESS SINCE PREVIOUS ENCOUNTER:  Client distressed regarding work, family and interpersonal matters; processed concerns, explored options and provided support.    TREATMENT PLAN:  Work through unresolved issues, develop options to navigate challenges and provide support.     TREATMENT MODALITY:  Supportive  EMDR Therapy  2 x month.

## 2024-09-05 ENCOUNTER — APPOINTMENT (OUTPATIENT)
Dept: PRIMARY CARE | Facility: CLINIC | Age: 49
End: 2024-09-05
Payer: COMMERCIAL

## 2024-09-05 DIAGNOSIS — R53.82 CHRONIC FATIGUE: ICD-10-CM

## 2024-09-05 DIAGNOSIS — Z71.3 DIETARY COUNSELING: ICD-10-CM

## 2024-09-05 DIAGNOSIS — G47.33 OBSTRUCTIVE SLEEP APNEA: ICD-10-CM

## 2024-09-05 DIAGNOSIS — F33.40 DEPRESSION, MAJOR, RECURRENT, IN REMISSION (CMS-HCC): ICD-10-CM

## 2024-09-05 DIAGNOSIS — I10 PRIMARY HYPERTENSION: ICD-10-CM

## 2024-09-05 DIAGNOSIS — R03.0 ELEVATED BP WITHOUT DIAGNOSIS OF HYPERTENSION: ICD-10-CM

## 2024-09-05 DIAGNOSIS — E88.810 INSULIN RESISTANCE SYNDROME: ICD-10-CM

## 2024-09-05 DIAGNOSIS — Z12.31 ENCOUNTER FOR SCREENING MAMMOGRAM FOR MALIGNANT NEOPLASM OF BREAST: ICD-10-CM

## 2024-09-05 DIAGNOSIS — I10 HYPERTENSION, UNSPECIFIED TYPE: Primary | ICD-10-CM

## 2024-09-05 DIAGNOSIS — E03.9 HYPOTHYROIDISM, UNSPECIFIED TYPE: ICD-10-CM

## 2024-09-05 DIAGNOSIS — R63.5 WEIGHT GAIN: ICD-10-CM

## 2024-09-05 PROCEDURE — 3079F DIAST BP 80-89 MM HG: CPT | Performed by: INTERNAL MEDICINE

## 2024-09-05 PROCEDURE — 3008F BODY MASS INDEX DOCD: CPT | Performed by: INTERNAL MEDICINE

## 2024-09-05 PROCEDURE — RXMED WILLOW AMBULATORY MEDICATION CHARGE

## 2024-09-05 PROCEDURE — 99213 OFFICE O/P EST LOW 20 MIN: CPT | Performed by: INTERNAL MEDICINE

## 2024-09-05 PROCEDURE — 3077F SYST BP >= 140 MM HG: CPT | Performed by: INTERNAL MEDICINE

## 2024-09-05 RX ORDER — OLMESARTAN MEDOXOMIL 5 MG/1
5 TABLET ORAL DAILY
Qty: 30 TABLET | Refills: 11 | Status: SHIPPED | OUTPATIENT
Start: 2024-09-05 | End: 2025-09-05

## 2024-09-05 NOTE — PATIENT INSTRUCTIONS
Good to see you today  Your blood pressure is up again today.  WE reviewed your old pressures and I think it would be garcia if we had your numbers come down a bit.  Of course continue the non pharmacologic ways to lower the blood pressure.    Today we are starting you on Olmesartan 5 mg daily.  You should feel no side effects at all.  Please get your blood tested 2 weeks after starting and we will have a phone call to check on your blood pressure in 4-6 weeks.  Our goal is <130/80.  Take your pressure 1-2 times a week and record.    We have a full physical scheduled for February.  Here if you need me in the interim  Mammogram is due and thank you  Influenza vaccine after the mammogram  Colonoscopy at the end of the month  You have a great attitude about issues in your life.  Glad you have support.

## 2024-09-05 NOTE — PROGRESS NOTES
Subjective   Patient ID:   [id number]   [id number]   Paradise Brothers is a 48 y.o. female who presents for No chief complaint on file..  HPI    48 year old female here for followup of her health issues.   She has to do a medical physics residency.  Her fatigue was better but now recurring a bit.  She has less stress as has not seen Adrian in a while.  He is almost 16 years old and his mom is no longer making him come for visitations.  He has some mental health challenges so she is trying to support in ways that are not demanding.  Cecilio still comes over regularly and it is a reprieve.  Adrian is at [employer] and Cecilio is at [employer].  Cecilio is in marching band as a drummer.  They are in 10th grade.  At home her blood pressure has been high 130/70s.  She has colonoscopy scheduled the end of the month.      ROS were reviewed and are negative with the exception of what is noted in HPI    There were no vitals taken for this visit.  Objective   Physical Exam  Vitals reviewed.   Constitutional:       General: She is not in acute distress.  HENT:      Head: Normocephalic and atraumatic.      Right Ear: Tympanic membrane normal.      Left Ear: Tympanic membrane normal.      Mouth/Throat:      Mouth: Mucous membranes are dry.   Eyes:      Extraocular Movements: Extraocular movements intact.      Pupils: Pupils are equal, round, and reactive to light.   Cardiovascular:      Rate and Rhythm: Normal rate and regular rhythm.      Heart sounds: No murmur heard.     No friction rub. No gallop.   Pulmonary:      Effort: Pulmonary effort is normal.      Breath sounds: No wheezing, rhonchi or rales.   Abdominal:      Palpations: Abdomen is soft.      Tenderness: There is no abdominal tenderness.   Musculoskeletal:         General: No swelling.      Cervical back: Neck supple.   Neurological:      Mental Status: She is alert.         Assessment/Plan     Provider  Impressions    #Hypertension - she has tended to run high and even at home feel would do better with small dose of meds.  Added olmesartan 5 mg daily and have her recheck BP and recheck renal profile in 2 weeks after starting ARB.  Also emphasized non pharmacologic ways to lower blood pressure and she is aware of these too.   Will do phone visit to check on response in 6-8 weeks regarding pressures, lab and any side effects  #. left leg weakness - complete neurologic workup was negative. SEems to be suprapatellar and related to her knee. SHe is doing better walking her dog, Rylee.  Suspect the quad weakness is related to the knee.  Interesting that her joint discomfort eased on paxlovid and returned with stopping.    #. Fatigue - actually doing a bit better using CPAP, still with many personal stressors but coping better and some have eased.   #. Depression - she has good support with psychiatry, counselor and friends.   #. KERRY - she has CPAP mask at home now.   # weight- she saw endocrine and the Wegovy was denied. She did not realize it was ordered as would have considered using HSA savings to try the medication.  SHe will return to endocrine to try again.  Recheck her hemoglobin A1C and feel if her number is elevated would be able to prescribe the GLP1 agonist for control  #. Vitamin D -  #. Foot fracture - doing better  #. Facial rash - rosacea got worse after COVID. To derm   #. shoulder repair - better  #. Health maintenance   - full PE 3/24 and scheduled for followup 3/25    - PAP (s/p hysterectomy, one ovary intact)   -mammo ordered with repeat ultrasound if indicated  -Colonoscopy ordered and scheduled for end of the month.    - immunizations:  flu shot today    Jazmyne Herrera MD

## 2024-09-07 VITALS
TEMPERATURE: 97.8 F | SYSTOLIC BLOOD PRESSURE: 150 MMHG | DIASTOLIC BLOOD PRESSURE: 90 MMHG | OXYGEN SATURATION: 97 % | WEIGHT: 280 LBS | HEIGHT: 66 IN | BODY MASS INDEX: 45 KG/M2 | HEART RATE: 77 BPM

## 2024-09-07 PROBLEM — I10 PRIMARY HYPERTENSION: Status: ACTIVE | Noted: 2024-09-07

## 2024-09-07 PROBLEM — R03.0 ELEVATED BP WITHOUT DIAGNOSIS OF HYPERTENSION: Status: RESOLVED | Noted: 2023-12-13 | Resolved: 2024-09-07

## 2024-09-09 ENCOUNTER — PHARMACY VISIT (OUTPATIENT)
Dept: PHARMACY | Facility: CLINIC | Age: 49
End: 2024-09-09
Payer: COMMERCIAL

## 2024-09-11 ENCOUNTER — APPOINTMENT (OUTPATIENT)
Dept: BEHAVIORAL HEALTH | Facility: CLINIC | Age: 49
End: 2024-09-11
Payer: COMMERCIAL

## 2024-09-19 ENCOUNTER — TELEPHONE (OUTPATIENT)
Dept: GASTROENTEROLOGY | Facility: HOSPITAL | Age: 49
End: 2024-09-19
Payer: COMMERCIAL

## 2024-09-25 ENCOUNTER — HOSPITAL ENCOUNTER (OUTPATIENT)
Dept: GASTROENTEROLOGY | Facility: HOSPITAL | Age: 49
Setting detail: OUTPATIENT SURGERY
Discharge: HOME | End: 2024-09-25
Payer: COMMERCIAL

## 2024-09-25 VITALS
HEART RATE: 78 BPM | HEIGHT: 66 IN | BODY MASS INDEX: 44.68 KG/M2 | RESPIRATION RATE: 20 BRPM | WEIGHT: 278 LBS | TEMPERATURE: 96.8 F | SYSTOLIC BLOOD PRESSURE: 126 MMHG | OXYGEN SATURATION: 100 % | DIASTOLIC BLOOD PRESSURE: 70 MMHG

## 2024-09-25 DIAGNOSIS — R63.5 WEIGHT GAIN: ICD-10-CM

## 2024-09-25 DIAGNOSIS — Z12.11 COLON CANCER SCREENING: ICD-10-CM

## 2024-09-25 PROCEDURE — 7100000009 HC PHASE TWO TIME - INITIAL BASE CHARGE

## 2024-09-25 PROCEDURE — 99152 MOD SED SAME PHYS/QHP 5/>YRS: CPT | Performed by: INTERNAL MEDICINE

## 2024-09-25 PROCEDURE — 2500000004 HC RX 250 GENERAL PHARMACY W/ HCPCS (ALT 636 FOR OP/ED): Performed by: INTERNAL MEDICINE

## 2024-09-25 PROCEDURE — 7100000010 HC PHASE TWO TIME - EACH INCREMENTAL 1 MINUTE

## 2024-09-25 PROCEDURE — 2720000007 HC OR 272 NO HCPCS

## 2024-09-25 PROCEDURE — 45385 COLONOSCOPY W/LESION REMOVAL: CPT | Performed by: INTERNAL MEDICINE

## 2024-09-25 PROCEDURE — 3700000012 HC SEDATION LEVEL 5+ TIME - INITIAL 15 MINUTES 5/> YEARS

## 2024-09-25 RX ORDER — FENTANYL CITRATE 50 UG/ML
INJECTION, SOLUTION INTRAMUSCULAR; INTRAVENOUS AS NEEDED
Status: COMPLETED | OUTPATIENT
Start: 2024-09-25 | End: 2024-09-25

## 2024-09-25 RX ORDER — MIDAZOLAM HYDROCHLORIDE 1 MG/ML
INJECTION, SOLUTION INTRAMUSCULAR; INTRAVENOUS AS NEEDED
Status: COMPLETED | OUTPATIENT
Start: 2024-09-25 | End: 2024-09-25

## 2024-09-25 ASSESSMENT — PAIN - FUNCTIONAL ASSESSMENT
PAIN_FUNCTIONAL_ASSESSMENT: 0-10

## 2024-09-25 ASSESSMENT — PAIN SCALES - GENERAL
PAINLEVEL_OUTOF10: 0 - NO PAIN
PAINLEVEL_OUTOF10: 4
PAINLEVEL_OUTOF10: 0 - NO PAIN
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
## 2024-10-04 LAB
LABORATORY COMMENT REPORT: NORMAL
PATH REPORT.FINAL DX SPEC: NORMAL
PATH REPORT.GROSS SPEC: NORMAL
PATH REPORT.RELEVANT HX SPEC: NORMAL
PATH REPORT.TOTAL CANCER: NORMAL

## 2024-10-09 ENCOUNTER — APPOINTMENT (OUTPATIENT)
Dept: BEHAVIORAL HEALTH | Facility: CLINIC | Age: 49
End: 2024-10-09
Payer: COMMERCIAL

## 2024-10-09 DIAGNOSIS — F43.89 OTHER REACTIONS TO SEVERE STRESS: ICD-10-CM

## 2024-10-09 PROCEDURE — 90837 PSYTX W PT 60 MINUTES: CPT | Performed by: SOCIAL WORKER

## 2024-10-10 ENCOUNTER — LAB (OUTPATIENT)
Dept: LAB | Facility: LAB | Age: 49
End: 2024-10-10
Payer: COMMERCIAL

## 2024-10-10 ENCOUNTER — APPOINTMENT (OUTPATIENT)
Dept: PRIMARY CARE | Facility: CLINIC | Age: 49
End: 2024-10-10
Payer: COMMERCIAL

## 2024-10-10 DIAGNOSIS — F34.1 PERSISTENT DEPRESSIVE DISORDER WITH ANXIOUS DISTRESS, CURRENTLY MODERATE: ICD-10-CM

## 2024-10-10 DIAGNOSIS — I10 HYPERTENSION, UNSPECIFIED TYPE: ICD-10-CM

## 2024-10-10 DIAGNOSIS — I10 PRIMARY HYPERTENSION: Primary | ICD-10-CM

## 2024-10-10 PROBLEM — D12.6 TUBULAR ADENOMA OF COLON: Status: ACTIVE | Noted: 2024-10-10

## 2024-10-10 LAB
ANION GAP SERPL CALC-SCNC: 14 MMOL/L (ref 10–20)
BUN SERPL-MCNC: 11 MG/DL (ref 6–23)
CALCIUM SERPL-MCNC: 9.9 MG/DL (ref 8.6–10.6)
CHLORIDE SERPL-SCNC: 102 MMOL/L (ref 98–107)
CO2 SERPL-SCNC: 27 MMOL/L (ref 21–32)
CREAT SERPL-MCNC: 0.62 MG/DL (ref 0.5–1.05)
EGFRCR SERPLBLD CKD-EPI 2021: >90 ML/MIN/1.73M*2
GLUCOSE SERPL-MCNC: 125 MG/DL (ref 74–99)
POTASSIUM SERPL-SCNC: 4.3 MMOL/L (ref 3.5–5.3)
SODIUM SERPL-SCNC: 139 MMOL/L (ref 136–145)

## 2024-10-10 PROCEDURE — 36415 COLL VENOUS BLD VENIPUNCTURE: CPT

## 2024-10-10 PROCEDURE — RXMED WILLOW AMBULATORY MEDICATION CHARGE

## 2024-10-10 PROCEDURE — 80048 BASIC METABOLIC PNL TOTAL CA: CPT

## 2024-10-10 PROCEDURE — 99212 OFFICE O/P EST SF 10 MIN: CPT | Performed by: INTERNAL MEDICINE

## 2024-10-10 NOTE — PROGRESS NOTES
Subjective   Patient ID:   [id number]   [id number]   Paradise Brothers is a 48 y.o. female who presents for No chief complaint on file..  HPI    48 year old female on phone call.  She has not been taking her blood pressure readings so unclear if the meds are hitting our target but she did have a colonoscopy and those numbers were great.  Had her colonoscopy which showed one tubular adenoma .9 x.4 x .2cm  so repeat in 10 years seems appropriate.    ROS were reviewed and are negative with the exception of what is noted in HPI    There were no vitals taken for this visit.  Objective   Physical Exam  Phone call and speech fluent.     Assessment/Plan     #Hypertension - tolerating the olmesartan without issue but has not really been checking her home BP.  At colonoscopy was good.  Renal profile checked today.  Encourage checking 1-2 times per week to make sure we are at target.  To see in followup in February.   # tubular adenoma - small one.  Repeat 2034 colonoscopy    DID NOT ADDRESS TODAY  #. left leg weakness - complete neurologic workup was negative. SEems to be suprapatellar and related to her knee. SHe is doing better walking her dog, Rylee.  Suspect the quad weakness is related to the knee.  Interesting that her joint discomfort eased on paxlovid and returned with stopping.    #. Fatigue - actually doing a bit better using CPAP, still with many personal stressors but coping better and some have eased.   #. Depression - she has good support with psychiatry, counselor and friends.   #. KERRY - she has CPAP mask at home now.   # weight- she saw endocrine and the Wegovy was denied. She did not realize it was ordered as would have considered using [employer] savings to try the medication.  SHe will return to endocrine to try again.  Recheck her hemoglobin A1C and feel if her number is elevated would be able to prescribe the GLP1 agonist for control  #. Vitamin D -  #. Foot fracture - doing better  #. Facial rash - rosacea got  worse after COVID. To derm   #. shoulder repair - better  #. Health maintenance   - full PE 3/24 and scheduled for followup 3/25    - PAP (s/p hysterectomy, one ovary intact)   -mammo ordered with repeat ultrasound if indicated  -Colonoscopy ordered and scheduled for end of the month.    - immunizations:  flu shot today    Jazmyne Herrera MD

## 2024-10-10 NOTE — PATIENT INSTRUCTIONS
We spoke on the phone today  You have not checked your pressure but it was checked at the colonoscopy and was good  Continue the Olmesartan 5 mg daily. Remember our goal blood pressure is <130/80.  Take your pressure 1-2 times a week and record.    We have a full physical scheduled for February.  Your colonoscopy showed one small polyp and  Dr. Cope said repeat in 10 years is good so that makes for 2034.  Mammogram is due still  Thanks for getting the renal profile.  Will call if there is a problem.

## 2024-10-15 ENCOUNTER — APPOINTMENT (OUTPATIENT)
Dept: RADIOLOGY | Facility: HOSPITAL | Age: 49
End: 2024-10-15
Payer: COMMERCIAL

## 2024-10-16 NOTE — PROGRESS NOTES
TIME:   12-1pm    PATIENT LOCATION:  Home (virtual)    SYMPTOMS:  Persistent disturbance in connection with family relationships and occupational stressors.    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  Work stressful and colleague reached out to client for support.  Client notes no way currently to solve problems - she does not have the power to change and improve things, although potential options for this explored. Stress continues with the kids and their struggles with attendance and school work. Options explored. Socially, client active and going with a friend to [place] for a long weekend.    TREATMENT PLAN:  Work through unresolved issues, develop options to navigate challenges and provide support.     TREATMENT MODALITY:  Supportive  EMDR Therapy  2 x month.

## 2024-10-17 ENCOUNTER — PHARMACY VISIT (OUTPATIENT)
Dept: PHARMACY | Facility: CLINIC | Age: 49
End: 2024-10-17
Payer: COMMERCIAL

## 2024-10-18 ENCOUNTER — HOSPITAL ENCOUNTER (OUTPATIENT)
Dept: RADIOLOGY | Facility: HOSPITAL | Age: 49
Discharge: HOME | End: 2024-10-18
Payer: COMMERCIAL

## 2024-10-18 VITALS — WEIGHT: 277.78 LBS | HEIGHT: 66 IN | BODY MASS INDEX: 44.64 KG/M2

## 2024-10-18 DIAGNOSIS — Z12.31 ENCOUNTER FOR SCREENING MAMMOGRAM FOR MALIGNANT NEOPLASM OF BREAST: ICD-10-CM

## 2024-10-18 PROCEDURE — 77067 SCR MAMMO BI INCL CAD: CPT

## 2024-10-23 ENCOUNTER — APPOINTMENT (OUTPATIENT)
Dept: BEHAVIORAL HEALTH | Facility: CLINIC | Age: 49
End: 2024-10-23
Payer: COMMERCIAL

## 2024-10-23 DIAGNOSIS — F43.89 OTHER REACTIONS TO SEVERE STRESS: ICD-10-CM

## 2024-10-23 PROCEDURE — 90837 PSYTX W PT 60 MINUTES: CPT | Performed by: SOCIAL WORKER

## 2024-10-28 ENCOUNTER — APPOINTMENT (OUTPATIENT)
Dept: BEHAVIORAL HEALTH | Facility: CLINIC | Age: 49
End: 2024-10-28
Payer: COMMERCIAL

## 2024-10-28 DIAGNOSIS — F43.89 OTHER REACTIONS TO SEVERE STRESS: ICD-10-CM

## 2024-10-28 PROCEDURE — 90837 PSYTX W PT 60 MINUTES: CPT | Performed by: SOCIAL WORKER

## 2024-10-29 ENCOUNTER — HOSPITAL ENCOUNTER (OUTPATIENT)
Dept: RADIOLOGY | Facility: HOSPITAL | Age: 49
Discharge: HOME | End: 2024-10-29
Payer: COMMERCIAL

## 2024-10-29 DIAGNOSIS — R92.8 ABNORMAL FINDING ON MAMMOGRAPHY: ICD-10-CM

## 2024-10-29 PROCEDURE — 76982 USE 1ST TARGET LESION: CPT | Mod: RT

## 2024-10-29 PROCEDURE — 76642 ULTRASOUND BREAST LIMITED: CPT | Mod: RT

## 2024-10-29 PROCEDURE — 76642 ULTRASOUND BREAST LIMITED: CPT | Mod: RIGHT SIDE | Performed by: RADIOLOGY

## 2024-11-06 ENCOUNTER — APPOINTMENT (OUTPATIENT)
Dept: BEHAVIORAL HEALTH | Facility: CLINIC | Age: 49
End: 2024-11-06
Payer: COMMERCIAL

## 2024-11-06 DIAGNOSIS — F43.89 OTHER REACTIONS TO SEVERE STRESS: ICD-10-CM

## 2024-11-06 PROCEDURE — 90837 PSYTX W PT 60 MINUTES: CPT | Performed by: SOCIAL WORKER

## 2024-11-07 NOTE — PROGRESS NOTES
TIME:   12-1pm    PATIENT LOCATION:  Home (virtual)    SYMPTOMS:  Persistent disturbance in connection with family relationships and occupational stressors.    FUNCTIONAL STATUS AND PROGRESS SINCE PREVIOUS ENCOUNTER:  Client indicates stressors ongoing (family, work) but has strong social supports. To move into new condo when fixed up.    TREATMENT PLAN:  Work through unresolved issues, develop options to navigate challenges and provide support.     TREATMENT MODALITY:  Supportive  EMDR Therapy  2 x month.

## 2024-11-09 PROCEDURE — RXMED WILLOW AMBULATORY MEDICATION CHARGE

## 2024-11-09 NOTE — PROGRESS NOTES
TIME:   11am-12pm    PATIENT LOCATION:  Home (virtual)    SYMPTOMS:  Persistent disturbance in connection with family relationships and occupational stressors.    FUNCTIONAL STATUS AND PROGRESS SINCE PREVIOUS ENCOUNTER:  Giving self a hard time and wondering where that comes from; reflecting on being in a different phase of life, with kids being older and no court-rleated custody issues. She relates dilemma: conflict between thought “I'm only human” vs the thought/feeling “I should do better.” Protective part's positive intention then targeted with visual BLS via Discovery process.    TREATMENT PLAN:  Work through unresolved issues, develop options to navigate challenges and provide support.     TREATMENT MODALITY:  Supportive  EMDR Therapy  2 x month.

## 2024-11-12 ENCOUNTER — PHARMACY VISIT (OUTPATIENT)
Dept: PHARMACY | Facility: CLINIC | Age: 49
End: 2024-11-12
Payer: COMMERCIAL

## 2024-11-20 ENCOUNTER — APPOINTMENT (OUTPATIENT)
Dept: BEHAVIORAL HEALTH | Facility: CLINIC | Age: 49
End: 2024-11-20
Payer: COMMERCIAL

## 2024-11-20 DIAGNOSIS — F43.89 OTHER REACTIONS TO SEVERE STRESS: ICD-10-CM

## 2024-11-20 PROCEDURE — 90837 PSYTX W PT 60 MINUTES: CPT | Performed by: SOCIAL WORKER

## 2024-11-29 NOTE — PROGRESS NOTES
"TIME:   12-1pm    PATIENT LOCATION:  Home (virtual)    SYMPTOMS:  Persistent disturbance in connection with family relationships and occupational stressors.    FUNCTIONAL STATUS AND PROGRESS SINCE PREVIOUS ENCOUNTER:  Client fearful following the election; client also relates dismay regarding the work it's taken to get to this place in time, and that \"disenfranchised.\"    TREATMENT PLAN:  Work through unresolved issues, develop options to navigate challenges and provide support.     TREATMENT MODALITY:  Supportive.  IFS-Informed EMDR Therapy  2 x month.  "

## 2024-12-04 ENCOUNTER — APPOINTMENT (OUTPATIENT)
Dept: BEHAVIORAL HEALTH | Facility: CLINIC | Age: 49
End: 2024-12-04
Payer: COMMERCIAL

## 2024-12-04 DIAGNOSIS — F43.89 OTHER REACTIONS TO SEVERE STRESS: ICD-10-CM

## 2024-12-04 PROCEDURE — 90837 PSYTX W PT 60 MINUTES: CPT | Performed by: SOCIAL WORKER

## 2024-12-06 ENCOUNTER — SOCIAL WORK (OUTPATIENT)
Dept: BEHAVIORAL HEALTH | Facility: CLINIC | Age: 49
End: 2024-12-06
Payer: COMMERCIAL

## 2024-12-06 DIAGNOSIS — F43.89 OTHER REACTIONS TO SEVERE STRESS: ICD-10-CM

## 2024-12-06 PROCEDURE — 90837 PSYTX W PT 60 MINUTES: CPT | Performed by: SOCIAL WORKER
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
## 2024-12-17 PROCEDURE — RXMED WILLOW AMBULATORY MEDICATION CHARGE

## 2024-12-17 NOTE — PROGRESS NOTES
TIME:   12-1pm    PATIENT LOCATION:  Home (virtual)    SYMPTOMS:  Persistent disturbance in connection with family relationships and occupational stressors.    FUNCTIONAL STATUS AND PROGRESS SINCE PREVIOUS ENCOUNTER:  Current and persistent issues causing stress and concerns: family, friends and work. Client able to appropriately attend to responsibilities and set appropriate limits when needed.    TREATMENT PLAN:  Work through unresolved issues, develop options to navigate challenges and provide support.     TREATMENT MODALITY:  Supportive.  IFS-Informed EMDR Therapy  2 x month.

## 2024-12-18 ENCOUNTER — APPOINTMENT (OUTPATIENT)
Dept: SLEEP MEDICINE | Facility: CLINIC | Age: 49
End: 2024-12-18
Payer: COMMERCIAL

## 2024-12-18 ENCOUNTER — APPOINTMENT (OUTPATIENT)
Dept: BEHAVIORAL HEALTH | Facility: CLINIC | Age: 49
End: 2024-12-18
Payer: COMMERCIAL

## 2024-12-18 DIAGNOSIS — F43.89 OTHER REACTIONS TO SEVERE STRESS: ICD-10-CM

## 2024-12-18 DIAGNOSIS — G47.33 OBSTRUCTIVE SLEEP APNEA: Primary | ICD-10-CM

## 2024-12-18 DIAGNOSIS — E66.01 MORBID OBESITY WITH BMI OF 40.0-44.9, ADULT (MULTI): ICD-10-CM

## 2024-12-18 DIAGNOSIS — I10 PRIMARY HYPERTENSION: ICD-10-CM

## 2024-12-18 PROCEDURE — 99213 OFFICE O/P EST LOW 20 MIN: CPT | Mod: 95 | Performed by: NURSE PRACTITIONER

## 2024-12-18 PROCEDURE — 1036F TOBACCO NON-USER: CPT | Performed by: NURSE PRACTITIONER

## 2024-12-18 PROCEDURE — 90837 PSYTX W PT 60 MINUTES: CPT | Performed by: SOCIAL WORKER

## 2024-12-18 PROCEDURE — 99213 OFFICE O/P EST LOW 20 MIN: CPT | Performed by: NURSE PRACTITIONER

## 2024-12-20 ENCOUNTER — PHARMACY VISIT (OUTPATIENT)
Dept: PHARMACY | Facility: CLINIC | Age: 49
End: 2024-12-20
Payer: COMMERCIAL

## 2024-12-22 NOTE — PROGRESS NOTES
"TIME:   12-1pm    PATIENT LOCATION:  Home (virtual)    SYMPTOMS:  Persistent disturbance in connection with family relationships and occupational stressors.    FUNCTIONAL STATUS AND PROGRESS SINCE PREVIOUS ENCOUNTER:  Client relates much worry about the kids who she says are missing school and that she is unable to reach them.  She says she feels like she is \"shouting into the wind\" regarding needed help from professionals re this, and that she has not heared back from either the parenting coordinator or her attorny. Concerns processed and options explored. Extra session planned for this week to continue to discuss this and provide support.    TREATMENT PLAN:  Work through unresolved issues, develop options to navigate challenges and provide support.     TREATMENT MODALITY:  Supportive.  IFS-Informed EMDR Therapy  2 x month.  "
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
## 2024-12-22 NOTE — PROGRESS NOTES
TIME:   3-4pm    PATIENT LOCATION:  Home (virtual)    SYMPTOMS:  Persistent disturbance in connection with family relationships and occupational stressors.    FUNCTIONAL STATUS AND PROGRESS SINCE PREVIOUS ENCOUNTER:  Extra session per client, who continues to struggle with lack of cooperation of ex wife as well as lack of responsiveness of parenting coordinator and her . Concerns explored and provided support.    TREATMENT PLAN:  Work through unresolved issues, develop options to navigate challenges and provide support.     TREATMENT MODALITY:  Supportive.  IFS-Informed EMDR Therapy  2 x month.

## 2025-01-02 ENCOUNTER — APPOINTMENT (OUTPATIENT)
Dept: BEHAVIORAL HEALTH | Facility: CLINIC | Age: 50
End: 2025-01-02
Payer: COMMERCIAL

## 2025-01-02 DIAGNOSIS — F43.89 OTHER REACTIONS TO SEVERE STRESS: ICD-10-CM

## 2025-01-02 PROCEDURE — 90837 PSYTX W PT 60 MINUTES: CPT | Performed by: SOCIAL WORKER

## 2025-01-03 NOTE — PROGRESS NOTES
TIME:   9-10am    PATIENT LOCATION:  Home (virtual)    SYMPTOMS:  Persistent disturbance in connection with family relationships and occupational stressors.    FUNCTIONAL STATUS AND PROGRESS SINCE PREVIOUS ENCOUNTER:  Currently struggling with work issues; explored concerns and examined options.    TREATMENT PLAN:  Work through unresolved issues, develop options to navigate challenges and provide support.     TREATMENT MODALITY:  Supportive.  IFS-Informed EMDR Therapy  2 x month.

## 2025-01-15 ENCOUNTER — APPOINTMENT (OUTPATIENT)
Dept: BEHAVIORAL HEALTH | Facility: CLINIC | Age: 50
End: 2025-01-15
Payer: COMMERCIAL

## 2025-01-15 DIAGNOSIS — F43.89 OTHER REACTIONS TO SEVERE STRESS: ICD-10-CM

## 2025-01-15 PROCEDURE — 90837 PSYTX W PT 60 MINUTES: CPT | Performed by: SOCIAL WORKER

## 2025-01-15 NOTE — PROGRESS NOTES
TIME:   3-4pm    PATIENT LOCATION:  Home (virtual)    SYMPTOMS:  Persistent disturbance in connection with family relationships and occupational stressors.    FUNCTIONAL STATUS AND PROGRESS SINCE PREVIOUS ENCOUNTER:  Client reports recent problems with a co-worker led to her crying for three days. Supervisor is supportive to a point, now asking he be cc'd on emails with the co-worker. Client acknowledges experiencing betrayal and injustice a pattern.    TREATMENT PLAN:  Work through unresolved issues, develop options to navigate challenges and provide support.     TREATMENT MODALITY:  Supportive.  IFS-Informed EMDR Therapy  2 x month.

## 2025-01-29 ENCOUNTER — APPOINTMENT (OUTPATIENT)
Dept: BEHAVIORAL HEALTH | Facility: CLINIC | Age: 50
End: 2025-01-29
Payer: COMMERCIAL

## 2025-01-29 DIAGNOSIS — F43.89 OTHER REACTIONS TO SEVERE STRESS: ICD-10-CM

## 2025-01-29 PROCEDURE — 90837 PSYTX W PT 60 MINUTES: CPT | Performed by: SOCIAL WORKER

## 2025-01-30 PROCEDURE — RXMED WILLOW AMBULATORY MEDICATION CHARGE

## 2025-01-31 NOTE — PROGRESS NOTES
TIME:   3-4pm    PATIENT LOCATION:  Home (virtual)    SYMPTOMS:  Persistent disturbance in connection with family relationships and occupational stressors.    FUNCTIONAL STATUS AND PROGRESS SINCE PREVIOUS ENCOUNTER:  Uncle  not super close. Went with Lucille to support aunt and cousin. Issues continue with coworker, triggling unresolved pattern of unjust  scenarios with others. Client picking her battles.    TREATMENT PLAN:  Work through unresolved issues, develop options to navigate challenges and provide support.     TREATMENT MODALITY:  Supportive.  IFS-Informed EMDR Therapy  2 x month.

## 2025-02-01 ENCOUNTER — PHARMACY VISIT (OUTPATIENT)
Dept: PHARMACY | Facility: CLINIC | Age: 50
End: 2025-02-01
Payer: COMMERCIAL

## 2025-02-12 ENCOUNTER — APPOINTMENT (OUTPATIENT)
Dept: BEHAVIORAL HEALTH | Facility: CLINIC | Age: 50
End: 2025-02-12
Payer: COMMERCIAL

## 2025-02-13 ENCOUNTER — APPOINTMENT (OUTPATIENT)
Dept: BEHAVIORAL HEALTH | Facility: CLINIC | Age: 50
End: 2025-02-13
Payer: COMMERCIAL

## 2025-02-24 PROCEDURE — RXMED WILLOW AMBULATORY MEDICATION CHARGE

## 2025-02-26 ENCOUNTER — PHARMACY VISIT (OUTPATIENT)
Dept: PHARMACY | Facility: CLINIC | Age: 50
End: 2025-02-26
Payer: COMMERCIAL

## 2025-02-26 ENCOUNTER — APPOINTMENT (OUTPATIENT)
Dept: BEHAVIORAL HEALTH | Facility: CLINIC | Age: 50
End: 2025-02-26
Payer: COMMERCIAL

## 2025-02-26 DIAGNOSIS — F43.89 OTHER REACTIONS TO SEVERE STRESS: ICD-10-CM

## 2025-02-26 PROCEDURE — 90837 PSYTX W PT 60 MINUTES: CPT | Performed by: SOCIAL WORKER

## 2025-02-27 NOTE — PROGRESS NOTES
TIME:   3:15-4:15pm    PATIENT LOCATION:  Home (virtual)    SYMPTOMS:  Persistent disturbance in connection with family relationships and occupational stressors.    TREATMENT PLAN:  Work through unresolved issues, develop options to navigate challenges and provide support.     TREATMENT MODALITY:  Supportive.  IFS-Informed EMDR Therapy  2 x month.    FUNCTIONAL STATUS AND PROGRESS SINCE PREVIOUS ENCOUNTER:  Client reports she is navigating challenges with co-work and ex-wife but which contribute to overwhelm.

## 2025-03-12 ENCOUNTER — APPOINTMENT (OUTPATIENT)
Dept: BEHAVIORAL HEALTH | Facility: CLINIC | Age: 50
End: 2025-03-12
Payer: COMMERCIAL

## 2025-03-13 ENCOUNTER — APPOINTMENT (OUTPATIENT)
Dept: PRIMARY CARE | Facility: CLINIC | Age: 50
End: 2025-03-13
Payer: COMMERCIAL

## 2025-03-13 VITALS
OXYGEN SATURATION: 99 % | SYSTOLIC BLOOD PRESSURE: 129 MMHG | WEIGHT: 293 LBS | BODY MASS INDEX: 47.09 KG/M2 | HEART RATE: 69 BPM | HEIGHT: 66 IN | TEMPERATURE: 97.6 F | DIASTOLIC BLOOD PRESSURE: 71 MMHG

## 2025-03-13 DIAGNOSIS — I10 PRIMARY HYPERTENSION: ICD-10-CM

## 2025-03-13 DIAGNOSIS — E55.9 VITAMIN D DEFICIENCY: ICD-10-CM

## 2025-03-13 DIAGNOSIS — F32.A ANXIETY AND DEPRESSION: ICD-10-CM

## 2025-03-13 DIAGNOSIS — E88.810 INSULIN RESISTANCE SYNDROME: ICD-10-CM

## 2025-03-13 DIAGNOSIS — F43.10 POSTTRAUMATIC STRESS DISORDER: ICD-10-CM

## 2025-03-13 DIAGNOSIS — R63.5 WEIGHT GAIN: ICD-10-CM

## 2025-03-13 DIAGNOSIS — Z00.00 ANNUAL PHYSICAL EXAM: ICD-10-CM

## 2025-03-13 DIAGNOSIS — E03.9 HYPOTHYROIDISM, UNSPECIFIED TYPE: ICD-10-CM

## 2025-03-13 DIAGNOSIS — Z71.3 DIETARY COUNSELING: ICD-10-CM

## 2025-03-13 DIAGNOSIS — E66.01 MORBID OBESITY WITH BMI OF 40.0-44.9, ADULT (MULTI): ICD-10-CM

## 2025-03-13 DIAGNOSIS — R53.82 CHRONIC FATIGUE: Primary | ICD-10-CM

## 2025-03-13 DIAGNOSIS — F43.89 OTHER REACTIONS TO SEVERE STRESS: ICD-10-CM

## 2025-03-13 DIAGNOSIS — G47.33 OBSTRUCTIVE SLEEP APNEA: ICD-10-CM

## 2025-03-13 DIAGNOSIS — I10 HYPERTENSION, UNSPECIFIED TYPE: ICD-10-CM

## 2025-03-13 DIAGNOSIS — E78.5 HYPERLIPIDEMIA, UNSPECIFIED HYPERLIPIDEMIA TYPE: ICD-10-CM

## 2025-03-13 DIAGNOSIS — R29.898 WEAKNESS OF LIMB: ICD-10-CM

## 2025-03-13 DIAGNOSIS — Z12.31 BREAST CANCER SCREENING BY MAMMOGRAM: ICD-10-CM

## 2025-03-13 DIAGNOSIS — F41.9 ANXIETY AND DEPRESSION: ICD-10-CM

## 2025-03-13 PROCEDURE — 90715 TDAP VACCINE 7 YRS/> IM: CPT | Performed by: INTERNAL MEDICINE

## 2025-03-13 PROCEDURE — 99396 PREV VISIT EST AGE 40-64: CPT | Performed by: INTERNAL MEDICINE

## 2025-03-13 PROCEDURE — 3074F SYST BP LT 130 MM HG: CPT | Performed by: INTERNAL MEDICINE

## 2025-03-13 PROCEDURE — 3008F BODY MASS INDEX DOCD: CPT | Performed by: INTERNAL MEDICINE

## 2025-03-13 PROCEDURE — 3078F DIAST BP <80 MM HG: CPT | Performed by: INTERNAL MEDICINE

## 2025-03-13 PROCEDURE — G2211 COMPLEX E/M VISIT ADD ON: HCPCS | Performed by: INTERNAL MEDICINE

## 2025-03-13 PROCEDURE — 90471 IMMUNIZATION ADMIN: CPT | Performed by: INTERNAL MEDICINE

## 2025-03-13 RX ORDER — OLMESARTAN MEDOXOMIL 5 MG/1
5 TABLET ORAL DAILY
Qty: 90 TABLET | Refills: 3 | Status: SHIPPED | OUTPATIENT
Start: 2025-03-13 | End: 2026-03-13

## 2025-03-13 ASSESSMENT — PAIN SCALES - GENERAL: PAINLEVEL_OUTOF10: 0-NO PAIN

## 2025-03-13 NOTE — PROGRESS NOTES
"Subjective   Patient ID:   [id number]   [id number]   Paradise Brothers is a 49 y.o. female who presents for Follow-up and Annual Exam.  HPI    49 year old female here for annual exam.  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  She has started some extra research.  It is a 2 year cycle.  She will sit for the part 1 exam in August.  She uses the [employer] cards.  She is okay and she resigns herself to the process.  Her situation with her sons is less of a stress as less drama.  Her son Adrian is a superstar clarinetist.  Her son Cecilio loves the outdoors, more liberal arts in his focus.  She notes she was doing well although over the past couple of days she is more tired and seems to fatigue easier. She still has weakness in the left leg.  Unable to jog or skip but definitely improved and not where it was in terms of strength.  Still using her CPAP     ROS were reviewed and are negative with the exception of what is noted in HPI    /71 (BP Location: Right arm, Patient Position: Sitting, BP Cuff Size: Large adult)   Pulse 69   Temp 36.4 °C (97.6 °F) (Temporal)   Ht 1.676 m (5' 5.98\")   Wt 140 kg (308 lb 12.8 oz)   SpO2 99%   BMI 49.87 kg/m²   Objective   Physical Exam  Vitals reviewed.   Constitutional:       General: She is not in acute distress.  HENT:      Head: Normocephalic and atraumatic.      Right Ear: Tympanic membrane normal.      Left Ear: Tympanic membrane normal.      Mouth/Throat:      Mouth: Mucous membranes are moist.   Eyes:      Extraocular Movements: Extraocular movements intact.      Pupils: Pupils are equal, round, and reactive to light.   Cardiovascular:      Rate and Rhythm: Normal rate and regular rhythm.      Heart sounds: No murmur heard.     No friction rub. No gallop.   Pulmonary:      Effort: Pulmonary effort is normal.      Breath sounds: No wheezing, rhonchi or rales.   Abdominal:      Palpations: Abdomen is soft.      Tenderness: There is no abdominal " tenderness. There is no guarding.   Musculoskeletal:         General: No swelling.      Cervical back: Neck supple.   Lymphadenopathy:      Cervical: No cervical adenopathy.   Neurological:      Mental Status: She is alert.           Assessment/Plan     #Hypertension - great response to small dose of olmesartan.  Glad we added this regimen as more challenges with weight.  She has gained 27 pounds over the past year.    #. left leg weakness - complete neurologic workup was negative. SEems to be suprapatellar and related to her knee. SHe is doing better walking her dog, Rylee and still has some weakness but able to manage well.  Suspect the quad weakness is related to the knee.  Interesting that her joint discomfort eased on paxlovid and returned with stopping.    #. Fatigue - actually doing better overall suspect recent setback related to the daylight saving time change.  Still using CPAP, still with many personal stressors but coping better.   #. Depression - she has good support with psychiatry, counselor and friends. No meds  #. KERRY - wears CPAP mask at home now.   # weight- she saw endocrine and the Wegovy was denied. Still feel she would be excellent candidate for this medication given her lifetime challenges with weight.  Discussed the [employer].  She is aware of side effects and need to maintain good body core strength to avoid the muscle loss.    #. Vitamin D - check levels  #. Foot fracture 2017- doing better  #. Facial rash - rosacea got worse after COVID. To derm   #. shoulder repair, right shoulder arthroscopic glenohumeral debridement and open biceps tenodesis on 2/19/2020  - better  #. Health maintenance   - full PE 3/25     - PAP (s/p hysterectomy 2017, one ovary (right) intact, endometrial hyperplasia) last performed 2015  -mammo ordered for 10/25  -Colonoscopy 9/24 with tubular adenoma 9x4x2 mm.  Repeat 2029.   - immunizations: current reminded at 50 needs shingrix and prevnar  20      Jazmyne Herrera MD

## 2025-03-13 NOTE — PATIENT INSTRUCTIONS
Good to see you today  2.   You have gained 27 pounds and on exam otherwise look good heart sounds good, lungs are clear, no swelling in the legs and good pulses distally  3.  At age 50 you will need the shingrix series (2 shots  by 2-6 months0 which is reactogenic for 24 hours after injection and you now need the penumonia vaccine prevnar 20 or 21.    4.  Consider trial of ozempic or mounjaro via [employer].  They seem to be a reputable compounding company.    5.  Names of future PCPs. Doctors who trained in our residency: Abimael Bailey (med peds) at [facility], Jason Shaffer in [place], Frakn Marin and John Julian in [place] and Lucas Knight at [facility] (practice closed) but Herberth Negrete is also in the practice and has a good reputation. Not trained at our practice are Clotilde Gordon at [place]. Other family practice docs who I heard of as good practioners are Flakita Medellin ([place]) and Sharri Phillips on [facility] .  Also there is the  option and [facility] has solid internists in the group.     6.  Mammogram ordered for October 7.  Colonoscopy repeat in 2029  8.  We discussed healthy habits  9.  It has been a privilege and a pleasure to be your doctor over these past years.  Wishing you good health.

## 2025-03-16 PROBLEM — R31.0 GROSS HEMATURIA: Status: RESOLVED | Noted: 2023-10-29 | Resolved: 2025-03-16

## 2025-03-16 PROBLEM — E03.9 HYPOTHYROIDISM: Status: RESOLVED | Noted: 2023-10-29 | Resolved: 2025-03-16

## 2025-03-16 PROBLEM — R30.0 DYSURIA: Status: RESOLVED | Noted: 2023-10-29 | Resolved: 2025-03-16

## 2025-03-16 PROBLEM — S09.90XA INJURY OF HEAD AND NECK: Status: RESOLVED | Noted: 2023-10-29 | Resolved: 2025-03-16

## 2025-03-16 PROBLEM — S19.9XXA INJURY OF HEAD AND NECK: Status: RESOLVED | Noted: 2023-10-29 | Resolved: 2025-03-16

## 2025-03-17 ENCOUNTER — APPOINTMENT (OUTPATIENT)
Facility: CLINIC | Age: 50
End: 2025-03-17
Payer: COMMERCIAL

## 2025-03-26 ENCOUNTER — APPOINTMENT (OUTPATIENT)
Dept: BEHAVIORAL HEALTH | Facility: CLINIC | Age: 50
End: 2025-03-26
Payer: COMMERCIAL

## 2025-03-26 DIAGNOSIS — F43.89 OTHER REACTIONS TO SEVERE STRESS: ICD-10-CM

## 2025-03-26 PROCEDURE — 90837 PSYTX W PT 60 MINUTES: CPT | Performed by: SOCIAL WORKER

## 2025-04-02 NOTE — PROGRESS NOTES
TIME:   3-4pm    PATIENT LOCATION:  Home (virtual)    SYMPTOMS:  Persistent disturbance in connection with family relationships and occupational stressors.    TREATMENT PLAN:  Work through unresolved issues, develop options to navigate challenges and provide support.     TREATMENT MODALITY:  Supportive.  2 x month.    FUNCTIONAL STATUS AND PROGRESS SINCE PREVIOUS ENCOUNTER:  Client reports  while things have been hectic she is feeling good about herself and empowered; she relates feeling she is now in a healthy dating relationship and is able to maintain healthy emotional boundaries with her ex as well as her mother and continues to provide support and guidance in her parenting role. 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

## 2025-05-01 ENCOUNTER — APPOINTMENT (OUTPATIENT)
Dept: BEHAVIORAL HEALTH | Facility: CLINIC | Age: 50
End: 2025-05-01
Payer: COMMERCIAL

## 2025-05-01 DIAGNOSIS — F43.89 OTHER REACTIONS TO SEVERE STRESS: ICD-10-CM

## 2025-05-01 PROCEDURE — 90837 PSYTX W PT 60 MINUTES: CPT | Performed by: SOCIAL WORKER

## 2025-05-14 ENCOUNTER — APPOINTMENT (OUTPATIENT)
Dept: BEHAVIORAL HEALTH | Facility: CLINIC | Age: 50
End: 2025-05-14
Payer: COMMERCIAL

## 2025-05-14 DIAGNOSIS — F43.89 OTHER REACTIONS TO SEVERE STRESS: ICD-10-CM

## 2025-05-14 PROCEDURE — 90834 PSYTX W PT 45 MINUTES: CPT | Performed by: SOCIAL WORKER

## 2025-05-22 NOTE — PROGRESS NOTES
TIME:   3:02-5:07pm    PATIENT LOCATION:  Home (virtual)    SYMPTOMS:  Persistent disturbance in connection with family relationships and occupational stressors.    TREATMENT PLAN:  Work through unresolved issues, develop options to navigate challenges and provide support.     TREATMENT MODALITY:  Interpersonal Psychotherapy:  Utilized validation, attunement and synchronicity to build connection and sense of safety and security.  Problem solving to explore options.  2 x month.    FUNCTIONAL STATUS AND PROGRESS SINCE PREVIOUS ENCOUNTER:  Client very distressed about work situation that is not being handled appropriately, and she is at the mercy of the lack of leadership; client feeling trapped and at the mercy of this difficult situation.

## 2025-05-29 ENCOUNTER — APPOINTMENT (OUTPATIENT)
Dept: BEHAVIORAL HEALTH | Facility: CLINIC | Age: 50
End: 2025-05-29
Payer: COMMERCIAL

## 2025-05-29 DIAGNOSIS — F43.89 OTHER REACTIONS TO SEVERE STRESS: ICD-10-CM

## 2025-05-29 PROCEDURE — 90837 PSYTX W PT 60 MINUTES: CPT | Performed by: SOCIAL WORKER

## 2025-06-01 NOTE — PROGRESS NOTES
TIME:   9-9:42am    PATIENT LOCATION:  Home (virtual)    SYMPTOMS:  Persistent disturbance in connection with family relationships and occupational stressors.    TREATMENT PLAN:  Work through unresolved issues, develop options to navigate challenges and provide support.     TREATMENT MODALITY:  Interpersonal Psychotherapy:  Utilized validation, attunement and synchronicity to build connection and sense of safety and security.  2 x month.    FUNCTIONAL STATUS AND PROGRESS SINCE PREVIOUS ENCOUNTER:  Client struggling with work, family and interpersonal issues, while able to be assertive and productive as she navigates pressures and responsibilities with family and at work; she is also, through all this, trying to study for [employer].

## 2025-06-03 ENCOUNTER — APPOINTMENT (OUTPATIENT)
Dept: BEHAVIORAL HEALTH | Facility: CLINIC | Age: 50
End: 2025-06-03
Payer: COMMERCIAL

## 2025-06-03 DIAGNOSIS — F34.1 PERSISTENT DEPRESSIVE DISORDER: ICD-10-CM

## 2025-06-03 PROCEDURE — 90837 PSYTX W PT 60 MINUTES: CPT | Performed by: SOCIAL WORKER

## 2025-06-03 NOTE — PROGRESS NOTES
TIME:   1-2pm    PATIENT LOCATION:  Home (virtual)    SYMPTOMS:  The client endorses sadness; sleep problems; low energy; at times hopeless; and, outside of work, difficulty making decisions.    TREATMENT PLAN:  Work through unresolved issues, develop options to navigate challenges and provide support.     TREATMENT MODALITY:  Interpersonal Psychotherapy: utilized validation, attunement and synchronicity to build connection and sense of safety and security.     2 x month.    FUNCTIONAL STATUS AND PROGRESS SINCE PREVIOUS ENCOUNTER:  Client reports ongoing and multiple stressors, that work is getting worse for her. She reports feeling overwhelmed and sad, relating the sadness at times can be more intense, and that at such times she's aware of feeling irritable and at times feels hopeless; she denies SI and is future-focused. She is planning a vacation with her girlfriend and they are beginning to entertain the idea of moving in together.  The client also reports sleep problems and lethargy, but notes she vacillates between having too much and too little energy. She says she tends to feel worse in the mornings. Future focused [place] ?[place] discussed music, beach

## 2025-06-03 NOTE — PROGRESS NOTES
TIME:   3-4pm    PATIENT LOCATION:  Home (virtual)    SYMPTOMS:  Persistent disturbance in connection with family relationships and occupational stressors.    TREATMENT PLAN:  Work through unresolved issues, develop options to navigate challenges and provide support.     TREATMENT MODALITY:  Supportive.  Interpersonal Psychotherapy:  Utilized validation, attunement and synchronicity to build connection and sense of safety and security.  2 x month.    FUNCTIONAL STATUS AND PROGRESS SINCE PREVIOUS ENCOUNTER:  Client reports  work getting worse. Talked to boss in December about it in December. Then issue with computer last week during which he sent an urgent message which client had to follow-up on. As a result she says she's been using a second laptop.

## 2025-06-10 ENCOUNTER — PHARMACY VISIT (OUTPATIENT)
Dept: PHARMACY | Facility: CLINIC | Age: 50
End: 2025-06-10
Payer: COMMERCIAL

## 2025-06-10 PROCEDURE — RXMED WILLOW AMBULATORY MEDICATION CHARGE

## 2025-06-12 ENCOUNTER — APPOINTMENT (OUTPATIENT)
Dept: BEHAVIORAL HEALTH | Facility: CLINIC | Age: 50
End: 2025-06-12
Payer: COMMERCIAL

## 2025-06-12 DIAGNOSIS — F34.1 PERSISTENT DEPRESSIVE DISORDER: ICD-10-CM

## 2025-06-12 PROCEDURE — 90837 PSYTX W PT 60 MINUTES: CPT | Performed by: SOCIAL WORKER

## 2025-06-26 ENCOUNTER — APPOINTMENT (OUTPATIENT)
Dept: BEHAVIORAL HEALTH | Facility: CLINIC | Age: 50
End: 2025-06-26
Payer: COMMERCIAL

## 2025-07-12 NOTE — PROGRESS NOTES
"TIME:   3-4pm    PATIENT LOCATION:  Home (virtual)    SYMPTOMS:  The client endorses sadness; sleep problems; low energy; at times hopeless; and, outside of work, difficulty making decisions.    TREATMENT PLAN:  Work through unresolved issues, develop options to navigate challenges and provide support.     TREATMENT MODALITY:  Interpersonal Psychotherapy: utilized validation, attunement and synchronicity to build connection and sense of safety and security.   2 x month.    FUNCTIONAL STATUS AND PROGRESS\"a mess.\"  Client indicates she is upset; angry, and described her recent work meeting as \"a mess.” Client also indicates she plans to write a followup email, and may take some ays off. Of her child S. Client says she picks her battles and is conserving endergy; she is focusing more on herself for the first time. She notes she has made herself flashcards to her her study for upcoming board exam.. Her affectf was significantly imporoved by the end of this session.  "

## 2025-07-17 ENCOUNTER — APPOINTMENT (OUTPATIENT)
Dept: BEHAVIORAL HEALTH | Facility: CLINIC | Age: 50
End: 2025-07-17
Payer: COMMERCIAL

## 2025-07-17 DIAGNOSIS — F34.1 PERSISTENT DEPRESSIVE DISORDER: ICD-10-CM

## 2025-07-17 PROCEDURE — 90837 PSYTX W PT 60 MINUTES: CPT | Performed by: SOCIAL WORKER

## 2025-07-31 ENCOUNTER — APPOINTMENT (OUTPATIENT)
Dept: BEHAVIORAL HEALTH | Facility: CLINIC | Age: 50
End: 2025-07-31
Payer: COMMERCIAL

## 2025-07-31 DIAGNOSIS — F34.1 PERSISTENT DEPRESSIVE DISORDER: ICD-10-CM

## 2025-07-31 PROCEDURE — 90837 PSYTX W PT 60 MINUTES: CPT | Performed by: SOCIAL WORKER

## 2025-08-05 NOTE — PROGRESS NOTES
TIME:   3-4pm    PATIENT LOCATION:  Home (virtual)    SYMPTOMS:  The client endorses sadness; sleep problems; low energy; at times hopeless; and, outside of work, difficulty making decisions.    TREATMENT PLAN:  Work through unresolved issues, develop options to navigate challenges and provide support.     TREATMENT MODALITY:  Interpersonal Psychotherapy: utilized validation, attunement and synchronicity to build connection and sense of safety and security.   2 x month.    FUNCTIONAL STATUS AND PROGRESS  Client indicates she is feeling down and overwhelmed about multiple immediate pressures: boards, work issue, moving (which would require downsizing). She also says she hasn't called for a medication assessment for medication.

## 2025-08-05 NOTE — PROGRESS NOTES
TIME:   3-4pm    PATIENT LOCATION:  Home (virtual)    SYMPTOMS:  The client endorses sadness; sleep problems; low energy; at times hopeless; and, outside of work, difficulty making decisions.    TREATMENT PLAN:  Work through unresolved issues, develop options to navigate challenges and provide support.     TREATMENT MODALITY:  Interpersonal Psychotherapy: utilized validation, attunement and synchronicity to build connection and sense of safety and security.   2 x month.    FUNCTIONAL STATUS AND PROGRESS  Client is upset and feeling betrayed by her girlfriend's sudden hesitation to move leaving the client to now have to have to make a last-minute decision about moving or signing a new year-long lease where she is currently staying and subsequently having to continue to bear the load of two homes; client relates much disappointment about this, along with feeling upset and betrayed regarding departmental mismanagement.

## 2025-08-14 ENCOUNTER — APPOINTMENT (OUTPATIENT)
Dept: BEHAVIORAL HEALTH | Facility: CLINIC | Age: 50
End: 2025-08-14
Payer: COMMERCIAL

## 2025-08-14 DIAGNOSIS — F34.1 PERSISTENT DEPRESSIVE DISORDER: ICD-10-CM

## 2025-08-14 PROCEDURE — 90837 PSYTX W PT 60 MINUTES: CPT | Performed by: SOCIAL WORKER

## 2025-08-27 ENCOUNTER — APPOINTMENT (OUTPATIENT)
Dept: BEHAVIORAL HEALTH | Facility: CLINIC | Age: 50
End: 2025-08-27
Payer: COMMERCIAL

## 2025-12-15 ENCOUNTER — APPOINTMENT (OUTPATIENT)
Facility: CLINIC | Age: 50
End: 2025-12-15
Payer: COMMERCIAL

## 2025-12-22 ENCOUNTER — APPOINTMENT (OUTPATIENT)
Facility: CLINIC | Age: 50
End: 2025-12-22
Payer: COMMERCIAL